# Patient Record
Sex: FEMALE | Race: WHITE | NOT HISPANIC OR LATINO | Employment: OTHER | URBAN - METROPOLITAN AREA
[De-identification: names, ages, dates, MRNs, and addresses within clinical notes are randomized per-mention and may not be internally consistent; named-entity substitution may affect disease eponyms.]

---

## 2022-01-01 ENCOUNTER — HOME CARE VISIT (OUTPATIENT)
Dept: HOME HOSPICE | Facility: HOSPICE | Age: 87
End: 2022-01-01
Payer: MEDICARE

## 2022-01-01 ENCOUNTER — HOME CARE VISIT (OUTPATIENT)
Dept: HOME HEALTH SERVICES | Facility: HOME HEALTHCARE | Age: 87
End: 2022-01-01
Payer: MEDICARE

## 2022-01-01 VITALS
RESPIRATION RATE: 18 BRPM | SYSTOLIC BLOOD PRESSURE: 102 MMHG | DIASTOLIC BLOOD PRESSURE: 54 MMHG | TEMPERATURE: 98.4 F | HEART RATE: 86 BPM | OXYGEN SATURATION: 98 %

## 2022-01-01 VITALS
RESPIRATION RATE: 18 BRPM | OXYGEN SATURATION: 96 % | DIASTOLIC BLOOD PRESSURE: 52 MMHG | SYSTOLIC BLOOD PRESSURE: 102 MMHG | HEART RATE: 80 BPM | TEMPERATURE: 97.6 F

## 2022-01-01 PROCEDURE — G0156 HHCP-SVS OF AIDE,EA 15 MIN: HCPCS

## 2022-01-01 PROCEDURE — G0299 HHS/HOSPICE OF RN EA 15 MIN: HCPCS

## 2022-01-01 PROCEDURE — G0155 HHCP-SVS OF CSW,EA 15 MIN: HCPCS

## 2022-05-12 ENCOUNTER — HOSPITAL ENCOUNTER (EMERGENCY)
Facility: HOSPITAL | Age: 87
Discharge: HOME | End: 2022-05-12
Attending: EMERGENCY MEDICINE | Admitting: EMERGENCY MEDICINE
Payer: COMMERCIAL

## 2022-05-12 ENCOUNTER — APPOINTMENT (EMERGENCY)
Dept: CT IMAGING | Facility: HOSPITAL | Age: 87
End: 2022-05-12
Payer: COMMERCIAL

## 2022-05-12 VITALS
OXYGEN SATURATION: 98 % | TEMPERATURE: 98.4 F | HEIGHT: 61 IN | SYSTOLIC BLOOD PRESSURE: 168 MMHG | HEART RATE: 75 BPM | DIASTOLIC BLOOD PRESSURE: 77 MMHG | RESPIRATION RATE: 16 BRPM | WEIGHT: 123 LBS | BODY MASS INDEX: 23.22 KG/M2

## 2022-05-12 DIAGNOSIS — K44.9 HIATAL HERNIA: ICD-10-CM

## 2022-05-12 DIAGNOSIS — N39.0 UTI (URINARY TRACT INFECTION): ICD-10-CM

## 2022-05-12 DIAGNOSIS — N81.10 BLADDER PROLAPSE, FEMALE, ACQUIRED: ICD-10-CM

## 2022-05-12 DIAGNOSIS — K80.20 GALLSTONES: ICD-10-CM

## 2022-05-12 DIAGNOSIS — R10.9 ABDOMINAL PAIN: Primary | ICD-10-CM

## 2022-05-12 DIAGNOSIS — N13.4 HYDROURETER ON LEFT: ICD-10-CM

## 2022-05-12 DIAGNOSIS — K86.89 PANCREATIC MASS: ICD-10-CM

## 2022-05-12 DIAGNOSIS — K40.20 BILATERAL INGUINAL HERNIA: ICD-10-CM

## 2022-05-12 LAB
ALBUMIN SERPL BCP-MCNC: 3.6 G/DL (ref 3.5–5)
ALP SERPL-CCNC: 106 U/L (ref 46–116)
ALT SERPL W P-5'-P-CCNC: 15 U/L (ref 12–78)
ANION GAP SERPL CALCULATED.3IONS-SCNC: 7 MMOL/L (ref 4–13)
AST SERPL W P-5'-P-CCNC: 16 U/L (ref 5–45)
BACTERIA UR QL AUTO: ABNORMAL /HPF
BASOPHILS # BLD AUTO: 0.04 THOUSANDS/ΜL (ref 0–0.1)
BASOPHILS NFR BLD AUTO: 0 % (ref 0–1)
BILIRUB SERPL-MCNC: 0.9 MG/DL (ref 0.2–1)
BILIRUB UR QL STRIP: NEGATIVE
BUN SERPL-MCNC: 22 MG/DL (ref 5–25)
CALCIUM SERPL-MCNC: 9.3 MG/DL (ref 8.3–10.1)
CHLORIDE SERPL-SCNC: 102 MMOL/L (ref 100–108)
CLARITY UR: ABNORMAL
CO2 SERPL-SCNC: 28 MMOL/L (ref 21–32)
COLOR UR: YELLOW
CREAT SERPL-MCNC: 1.26 MG/DL (ref 0.6–1.3)
EOSINOPHIL # BLD AUTO: 0.03 THOUSAND/ΜL (ref 0–0.61)
EOSINOPHIL NFR BLD AUTO: 0 % (ref 0–6)
ERYTHROCYTE [DISTWIDTH] IN BLOOD BY AUTOMATED COUNT: 13.7 % (ref 11.6–15.1)
GFR SERPL CREATININE-BSD FRML MDRD: 38 ML/MIN/1.73SQ M
GLUCOSE SERPL-MCNC: 173 MG/DL (ref 65–140)
GLUCOSE UR STRIP-MCNC: NEGATIVE MG/DL
HCT VFR BLD AUTO: 38.6 % (ref 34.8–46.1)
HGB BLD-MCNC: 11.9 G/DL (ref 11.5–15.4)
HGB UR QL STRIP.AUTO: ABNORMAL
HOLD SPECIMEN: NORMAL
IMM GRANULOCYTES # BLD AUTO: 0.03 THOUSAND/UL (ref 0–0.2)
IMM GRANULOCYTES NFR BLD AUTO: 0 % (ref 0–2)
KETONES UR STRIP-MCNC: NEGATIVE MG/DL
LEUKOCYTE ESTERASE UR QL STRIP: ABNORMAL
LIPASE SERPL-CCNC: 96 U/L (ref 73–393)
LYMPHOCYTES # BLD AUTO: 1.69 THOUSANDS/ΜL (ref 0.6–4.47)
LYMPHOCYTES NFR BLD AUTO: 18 % (ref 14–44)
MCH RBC QN AUTO: 26.6 PG (ref 26.8–34.3)
MCHC RBC AUTO-ENTMCNC: 30.8 G/DL (ref 31.4–37.4)
MCV RBC AUTO: 86 FL (ref 82–98)
MONOCYTES # BLD AUTO: 0.58 THOUSAND/ΜL (ref 0.17–1.22)
MONOCYTES NFR BLD AUTO: 6 % (ref 4–12)
NEUTROPHILS # BLD AUTO: 6.82 THOUSANDS/ΜL (ref 1.85–7.62)
NEUTS SEG NFR BLD AUTO: 76 % (ref 43–75)
NITRITE UR QL STRIP: POSITIVE
NON-SQ EPI CELLS URNS QL MICRO: ABNORMAL /HPF
NRBC BLD AUTO-RTO: 0 /100 WBCS
OTHER STN SPEC: ABNORMAL
PH UR STRIP.AUTO: 6 [PH]
PLATELET # BLD AUTO: 212 THOUSANDS/UL (ref 149–390)
PMV BLD AUTO: 10.9 FL (ref 8.9–12.7)
POTASSIUM SERPL-SCNC: 3.9 MMOL/L (ref 3.5–5.3)
PROT SERPL-MCNC: 8.3 G/DL (ref 6.4–8.2)
PROT UR STRIP-MCNC: ABNORMAL MG/DL
RBC # BLD AUTO: 4.48 MILLION/UL (ref 3.81–5.12)
RBC #/AREA URNS AUTO: ABNORMAL /HPF
SODIUM SERPL-SCNC: 137 MMOL/L (ref 136–145)
SP GR UR STRIP.AUTO: >=1.03 (ref 1–1.03)
UROBILINOGEN UR QL STRIP.AUTO: 0.2 E.U./DL
WBC # BLD AUTO: 9.19 THOUSAND/UL (ref 4.31–10.16)
WBC #/AREA URNS AUTO: ABNORMAL /HPF

## 2022-05-12 PROCEDURE — 85025 COMPLETE CBC W/AUTO DIFF WBC: CPT | Performed by: EMERGENCY MEDICINE

## 2022-05-12 PROCEDURE — 99284 EMERGENCY DEPT VISIT MOD MDM: CPT

## 2022-05-12 PROCEDURE — 99284 EMERGENCY DEPT VISIT MOD MDM: CPT | Performed by: PHYSICIAN ASSISTANT

## 2022-05-12 PROCEDURE — 74176 CT ABD & PELVIS W/O CONTRAST: CPT

## 2022-05-12 PROCEDURE — 83690 ASSAY OF LIPASE: CPT | Performed by: EMERGENCY MEDICINE

## 2022-05-12 PROCEDURE — 87086 URINE CULTURE/COLONY COUNT: CPT | Performed by: EMERGENCY MEDICINE

## 2022-05-12 PROCEDURE — 81001 URINALYSIS AUTO W/SCOPE: CPT | Performed by: EMERGENCY MEDICINE

## 2022-05-12 PROCEDURE — 36415 COLL VENOUS BLD VENIPUNCTURE: CPT

## 2022-05-12 PROCEDURE — 87186 SC STD MICRODIL/AGAR DIL: CPT | Performed by: EMERGENCY MEDICINE

## 2022-05-12 PROCEDURE — 87077 CULTURE AEROBIC IDENTIFY: CPT | Performed by: EMERGENCY MEDICINE

## 2022-05-12 PROCEDURE — 80053 COMPREHEN METABOLIC PANEL: CPT | Performed by: EMERGENCY MEDICINE

## 2022-05-12 PROCEDURE — G1004 CDSM NDSC: HCPCS

## 2022-05-12 RX ORDER — CEPHALEXIN 500 MG/1
500 CAPSULE ORAL EVERY 12 HOURS SCHEDULED
Qty: 14 CAPSULE | Refills: 0 | Status: SHIPPED | OUTPATIENT
Start: 2022-05-12 | End: 2022-05-19

## 2022-05-12 NOTE — DISCHARGE INSTRUCTIONS
Take keflex twice a day for urinary tract infection  Follow up with family doctor for recheck  REturn to ER if symptoms worsen  Follow up with GI doctor concerning pancreatic mass for further evaluation  Follow up with surgeon if you would like your inguinal hernias repaired  Tylenol/motrin for discomfort

## 2022-05-12 NOTE — ED PROVIDER NOTES
History  Chief Complaint   Patient presents with    Abdominal Pain     Pt with lower abd  Pain x 3 5 weeks  Denies n/v/d  Patient is an 80 y/o F that presents to the ED with lower abdominal pain x 3 weeks  She states it started after eating and has not gone away  She states the pain is worse at night when lying in bed at night  She denies nausea, vomiting or diarrhea  Her last BM was today, no blood in stool  No fevers, chills  She did not take anything for the pain  No urinary symptoms  No radiation of pain  No prior surgeries to abdomen  History provided by:  Patient  Abdominal Pain  Pain location:  Generalized  Pain quality: aching    Pain radiates to:  Does not radiate  Pain severity:  Moderate  Onset quality:  Gradual  Duration:  3 weeks  Timing:  Intermittent  Progression:  Unchanged  Chronicity:  New  Context: not sick contacts, not suspicious food intake and not trauma    Relieved by:  Nothing  Exacerbated by: lying flat  Ineffective treatments:  None tried  Associated symptoms: anorexia    Associated symptoms: no chest pain, no chills, no constipation, no cough, no diarrhea, no dysuria, no fever, no nausea, no shortness of breath and no vomiting    Risk factors: being elderly    Risk factors: no alcohol abuse, has not had multiple surgeries, not obese and no recent hospitalization        None       History reviewed  No pertinent past medical history  Past Surgical History:   Procedure Laterality Date    FEMUR FRACTURE SURGERY         History reviewed  No pertinent family history  I have reviewed and agree with the history as documented      E-Cigarette/Vaping    E-Cigarette Use Never User      E-Cigarette/Vaping Substances    Nicotine No     THC No     CBD No     Flavoring No     Other No     Unknown No      Social History     Tobacco Use    Smoking status: Never Smoker    Smokeless tobacco: Never Used   Vaping Use    Vaping Use: Never used   Substance Use Topics    Alcohol use: Not Currently       Review of Systems   Constitutional: Negative for chills and fever  HENT: Negative  Respiratory: Negative for cough and shortness of breath  Cardiovascular: Negative for chest pain, palpitations and leg swelling  Gastrointestinal: Positive for abdominal pain and anorexia  Negative for blood in stool, constipation, diarrhea, nausea and vomiting  Genitourinary: Negative for dysuria and frequency  Musculoskeletal: Negative for back pain and neck pain  Skin: Negative for color change and rash  Neurological: Negative for dizziness, weakness and numbness  Psychiatric/Behavioral: Negative for confusion  All other systems reviewed and are negative  Physical Exam  Physical Exam  Vitals and nursing note reviewed  Constitutional:       General: She is not in acute distress  Appearance: Normal appearance  She is well-developed, well-groomed and normal weight  She is not ill-appearing or diaphoretic  HENT:      Head: Normocephalic and atraumatic  Right Ear: External ear normal       Left Ear: External ear normal       Nose: Nose normal       Mouth/Throat:      Mouth: Mucous membranes are moist       Pharynx: Oropharynx is clear  Eyes:      Conjunctiva/sclera: Conjunctivae normal       Pupils: Pupils are equal    Cardiovascular:      Rate and Rhythm: Normal rate and regular rhythm  Heart sounds: Normal heart sounds  Pulmonary:      Effort: Pulmonary effort is normal       Breath sounds: Normal breath sounds  No wheezing, rhonchi or rales  Abdominal:      General: Abdomen is flat  Bowel sounds are normal       Palpations: Abdomen is soft  Tenderness: There is abdominal tenderness in the right lower quadrant, suprapubic area and left lower quadrant  There is no guarding or rebound  Musculoskeletal:         General: Normal range of motion  Cervical back: Normal range of motion and neck supple  Right lower leg: No edema        Left lower leg: No edema  Skin:     General: Skin is warm and dry  Coloration: Skin is not jaundiced or pale  Neurological:      General: No focal deficit present  Mental Status: She is alert and oriented to person, place, and time  Motor: No weakness  Psychiatric:         Mood and Affect: Mood normal          Behavior: Behavior is cooperative  Vital Signs  ED Triage Vitals [05/12/22 1058]   Temperature Pulse Respirations Blood Pressure SpO2   98 4 °F (36 9 °C) 75 16 168/77 98 %      Temp Source Heart Rate Source Patient Position - Orthostatic VS BP Location FiO2 (%)   Temporal -- Sitting Left arm --      Pain Score       7           Vitals:    05/12/22 1058   BP: 168/77   Pulse: 75   Patient Position - Orthostatic VS: Sitting         Visual Acuity      ED Medications  Medications - No data to display    Diagnostic Studies  Results Reviewed     Procedure Component Value Units Date/Time    Urine Microscopic [[id number]]  (Abnormal) Collected: 05/12/22 1103    Lab Status: Final result Specimen: Urine, Clean Catch Updated: 05/12/22 1400     RBC, UA 10-20 /hpf      WBC, UA Innumerable /hpf      Epithelial Cells None Seen /hpf      Bacteria, UA Innumerable /hpf      OTHER OBSERVATIONS WBCs Clumped  Renal Epithelial Cells Present    Urine culture [[id number]] Collected: 05/12/22 1103    Lab Status:  In process Specimen: Urine, Clean Catch Updated: 05/12/22 1400    UA w Reflex to Microscopic w Reflex to Culture [[id number]]  (Abnormal) Collected: 05/12/22 1103    Lab Status: Final result Specimen: Urine, Clean Catch Updated: 05/12/22 1355     Color, UA Yellow     Clarity, UA Turbid     Specific Gravity, UA >=1 030     pH, UA 6 0     Leukocytes, UA Moderate     Nitrite, UA Positive     Protein,  (2+) mg/dl      Glucose, UA Negative mg/dl      Ketones, UA Negative mg/dl      Urobilinogen, UA 0 2 E U /dl      Bilirubin, UA Negative     Blood, UA Moderate    Trauma tubes on hold [[id number]] Collected: 05/12/22 1107    Lab Status: Final result Specimen: Blood from Arm, Left Updated: 05/12/22 1308    Narrative: The following orders were created for panel order Trauma tubes on hold  Procedure                               Abnormality         Status                     ---------                               -----------         ------                     Cary Speaks Top on CMBN[[id number]]                           Final result               Gold top on BSJO[[id number]]                                 Final result               Green / Black tube on ZHOB[[id number]]                       Final result                 Please view results for these tests on the individual orders      Comprehensive metabolic panel [[id number]]  (Abnormal) Collected: 05/12/22 1104    Lab Status: Final result Specimen: Blood from Arm, Left Updated: 05/12/22 1136     Sodium 137 mmol/L      Potassium 3 9 mmol/L      Chloride 102 mmol/L      CO2 28 mmol/L      ANION GAP 7 mmol/L      BUN 22 mg/dL      Creatinine 1 26 mg/dL      Glucose 173 mg/dL      Calcium 9 3 mg/dL      AST 16 U/L      ALT 15 U/L      Alkaline Phosphatase 106 U/L      Total Protein 8 3 g/dL      Albumin 3 6 g/dL      Total Bilirubin 0 90 mg/dL      eGFR 38 ml/min/1 73sq m     Narrative:      [place] guidelines for Chronic Kidney Disease (CKD):     Stage 1 with normal or high GFR (GFR > 90 mL/min/1 73 square meters)    Stage 2 Mild CKD (GFR = 60-89 mL/min/1 73 square meters)    Stage 3A Moderate CKD (GFR = 45-59 mL/min/1 73 square meters)    Stage 3B Moderate CKD (GFR = 30-44 mL/min/1 73 square meters)    Stage 4 Severe CKD (GFR = 15-29 mL/min/1 73 square meters)    Stage 5 End Stage CKD (GFR <15 mL/min/1 73 square meters)  Note: GFR calculation is accurate only with a steady state creatinine    Lipase [[id number]]  (Normal) Collected: 05/12/22 1104    Lab Status: Final result Specimen: Blood from Arm, Left Updated: 05/12/22 1136     Lipase 96 u/L     CBC and differential [[id number]]  (Abnormal) Collected: 05/12/22 1104    Lab Status: Final result Specimen: Blood from Arm, Left Updated: 05/12/22 1116     WBC 9 19 Thousand/uL      RBC 4 48 Million/uL      Hemoglobin 11 9 g/dL      Hematocrit 38 6 %      MCV 86 fL      MCH 26 6 pg      MCHC 30 8 g/dL      RDW 13 7 %      MPV 10 9 fL      Platelets 330 Thousands/uL      nRBC 0 /100 WBCs      Neutrophils Relative 76 %      Immat GRANS % 0 %      Lymphocytes Relative 18 %      Monocytes Relative 6 %      Eosinophils Relative 0 %      Basophils Relative 0 %      Neutrophils Absolute 6 82 Thousands/µL      Immature Grans Absolute 0 03 Thousand/uL      Lymphocytes Absolute 1 69 Thousands/µL      Monocytes Absolute 0 58 Thousand/µL      Eosinophils Absolute 0 03 Thousand/µL      Basophils Absolute 0 04 Thousands/µL                  CT abdomen pelvis wo contrast   Final Result by Julian Walker MD (05/12 1325)       1  Suspected pancreatic mass  Additional evaluation with MRI abdomen with MRCP recommended  2   Left hydronephrosis and hydroureter with urinary bladder not visualized, prolapsed  Urothelial thickening could be related to underlying infection, inflammation or less likely neoplasm  Etiology of obstruction is not apparent  Evaluation for CT    urogram should be obtained when intravenous contrast is available  3   Cholelithiasis  4   Bilateral inguinal hernias with no complete obstruction on this study without oral contrast    I personally discussed this study with Des Peers on 5/12/2022 at 1:23 PM          Workstation performed: [id number]                    Procedures  Procedures         ED Course                               SBIRT 22yo+    Flowsheet Row Most Recent Value   SBIRT (23 yo +)    In order to provide better care to our patients, we are screening all of our patients for alcohol and drug use  Would it be okay to ask you these screening questions?  Yes Filed at: 05/12/2022 1102   Initial Alcohol Screen: US AUDIT-C     1  How often do you have a drink containing alcohol? 0 Filed at: 05/12/2022 1102   2  How many drinks containing alcohol do you have on a typical day you are drinking? 0 Filed at: 05/12/2022 1102   3a  Male UNDER 65: How often do you have five or more drinks on one occasion? 0 Filed at: 05/12/2022 1102   3b  FEMALE Any Age, or MALE 65+: How often do you have 4 or more drinks on one occassion? 0 Filed at: 05/12/2022 1102   Audit-C Score 0 Filed at: 05/12/2022 1102   JONEL: How many times in the past year have you    Used an illegal drug or used a prescription medication for non-medical reasons? Never Filed at: 05/12/2022 1102                    MDM  Number of Diagnoses or Management Options  Abdominal pain: new and requires workup  Bilateral inguinal hernia: new and requires workup  Bladder prolapse, female, acquired: established and worsening  Gallstones: new and requires workup  Hiatal hernia: established and worsening  Hydroureter on left: new and requires workup  Pancreatic mass: new and requires workup  UTI (urinary tract infection): new and requires workup  Diagnosis management comments: Patient with lower abdominal pain for 3 weeks, will check labs, CT abd and pelvis to r/o colitis, diverticulitis, UTI  Patient found to have UTI, will treat with keflex  Patient with multiple abnormal findings on CT scan:  Pancreatic mass suggested f/u with GI for further evaluation to r/o pancreatic cancer  Hydroureter most likely from infection  Patient with hernias, advised f/u with surgery  Return precautions given          Amount and/or Complexity of Data Reviewed  Clinical lab tests: ordered and reviewed  Tests in the radiology section of CPT®: reviewed and ordered  Discuss the patient with other providers: yes    Patient Progress  Patient progress: stable      Disposition  Final diagnoses:   Abdominal pain   UTI (urinary tract infection)   Hiatal hernia Pancreatic mass   Hydroureter on left   Bladder prolapse, female, acquired   Bilateral inguinal hernia   Gallstones     Time reflects when diagnosis was documented in both MDM as applicable and the Disposition within this note     Time User Action Codes Description Comment    5/12/2022  2:04 PM Tunisian Blander Add [R10 9] Abdominal pain     5/12/2022  2:04 PM Tunisian Blander Add [N39 0] UTI (urinary tract infection)     5/12/2022  2:04 PM Tunisian Blander Add [K44 9] Hiatal hernia     5/12/2022  2:04 PM Tunisian Blander Add [K86 89] Pancreatic mass     5/12/2022  2:05 PM Tunisian Blander Add [N13 4] Hydroureter on left     5/12/2022  2:05 PM Tunisian Blander Add [N81 10] Bladder prolapse, female, acquired     5/12/2022  2:05 PM Tunisian Blander Add [K40 20] Bilateral inguinal hernia     5/12/2022  2:05 PM Tunisian Blander Add [K80 20] Gallstones       ED Disposition     ED Disposition   Discharge    Condition   Stable    Date/Time   Thu May 12, 2022  2:04 PM    Comment   Gabbyia Rory discharge to home/self care  Follow-up Information     Follow up With Specialties Details Why Contact Info Additional [address]  [facility] Schedule an appointment as soon as possible for a visit  for further evaluation of pancreatic mass   [address] [place] [phone]  [facility] [address] [place]  [address]   [phone]     Salley Schaumann, MD General Surgery Schedule an appointment as soon as possible for a visit  as needed for inguinal hernias [address]             Discharge Medication List as of 5/12/2022  2:08 PM      START taking these medications    Details   cephalexin (KEFLEX) 500 mg capsule Take 1 capsule (500 mg total) by mouth every 12 (twelve) hours for 7 days, Starting Thu 5/12/2022, Until Thu 5/19/2022, Normal             No discharge procedures on file      PDMP Review     None          ED Provider  Electronically Signed by           Dulce Davila PA-C  05/12/22 3922

## 2022-05-15 LAB
BACTERIA UR CULT: ABNORMAL
BACTERIA UR CULT: ABNORMAL

## 2022-05-31 ENCOUNTER — TELEPHONE (OUTPATIENT)
Dept: FAMILY MEDICINE CLINIC | Facility: HOSPITAL | Age: 87
End: 2022-05-31

## 2022-06-01 ENCOUNTER — OFFICE VISIT (OUTPATIENT)
Dept: FAMILY MEDICINE CLINIC | Facility: HOSPITAL | Age: 87
End: 2022-06-01
Payer: COMMERCIAL

## 2022-06-01 VITALS
OXYGEN SATURATION: 97 % | SYSTOLIC BLOOD PRESSURE: 138 MMHG | WEIGHT: 114.8 LBS | HEART RATE: 70 BPM | BODY MASS INDEX: 21.67 KG/M2 | HEIGHT: 61 IN | DIASTOLIC BLOOD PRESSURE: 68 MMHG

## 2022-06-01 DIAGNOSIS — R73.01 ELEVATED FASTING GLUCOSE: ICD-10-CM

## 2022-06-01 DIAGNOSIS — G89.29 ABDOMINAL PAIN, CHRONIC, BILATERAL LOWER QUADRANT: Primary | ICD-10-CM

## 2022-06-01 DIAGNOSIS — R59.1 LYMPHADENOPATHY: ICD-10-CM

## 2022-06-01 DIAGNOSIS — N30.00 ACUTE CYSTITIS WITHOUT HEMATURIA: ICD-10-CM

## 2022-06-01 DIAGNOSIS — N18.31 STAGE 3A CHRONIC KIDNEY DISEASE (HCC): ICD-10-CM

## 2022-06-01 DIAGNOSIS — R10.32 ABDOMINAL PAIN, CHRONIC, BILATERAL LOWER QUADRANT: Primary | ICD-10-CM

## 2022-06-01 DIAGNOSIS — A49.8 E COLI INFECTION: ICD-10-CM

## 2022-06-01 DIAGNOSIS — K86.89 MASS OF PANCREAS: ICD-10-CM

## 2022-06-01 DIAGNOSIS — R10.31 ABDOMINAL PAIN, CHRONIC, BILATERAL LOWER QUADRANT: Primary | ICD-10-CM

## 2022-06-01 PROBLEM — M62.81 GENERALIZED MUSCLE WEAKNESS: Status: ACTIVE | Noted: 2018-04-13

## 2022-06-01 PROBLEM — N81.4 UTEROVAGINAL PROLAPSE, UNSPECIFIED: Status: ACTIVE | Noted: 2018-04-13

## 2022-06-01 PROBLEM — R26.2 DIFFICULTY IN WALKING, NOT ELSEWHERE CLASSIFIED: Status: ACTIVE | Noted: 2018-04-13

## 2022-06-01 PROBLEM — D62 ACUTE POSTHEMORRHAGIC ANEMIA: Status: ACTIVE | Noted: 2018-04-13

## 2022-06-01 PROBLEM — K30 FUNCTIONAL DYSPEPSIA: Status: ACTIVE | Noted: 2018-04-13

## 2022-06-01 LAB
SL AMB  POCT GLUCOSE, UA: ABNORMAL
SL AMB LEUKOCYTE ESTERASE,UA: ABNORMAL
SL AMB POCT BILIRUBIN,UA: ABNORMAL
SL AMB POCT BLOOD,UA: ABNORMAL
SL AMB POCT CLARITY,UA: ABNORMAL
SL AMB POCT COLOR,UA: YELLOW
SL AMB POCT HEMOGLOBIN AIC: 7.6 (ref ?–6.5)
SL AMB POCT KETONES,UA: ABNORMAL
SL AMB POCT NITRITE,UA: ABNORMAL
SL AMB POCT PH,UA: 6
SL AMB POCT SPECIFIC GRAVITY,UA: 1.02
SL AMB POCT URINE PROTEIN: ABNORMAL
SL AMB POCT UROBILINOGEN: ABNORMAL

## 2022-06-01 PROCEDURE — 87186 SC STD MICRODIL/AGAR DIL: CPT | Performed by: STUDENT IN AN ORGANIZED HEALTH CARE EDUCATION/TRAINING PROGRAM

## 2022-06-01 PROCEDURE — 1160F RVW MEDS BY RX/DR IN RCRD: CPT | Performed by: STUDENT IN AN ORGANIZED HEALTH CARE EDUCATION/TRAINING PROGRAM

## 2022-06-01 PROCEDURE — 83036 HEMOGLOBIN GLYCOSYLATED A1C: CPT | Performed by: STUDENT IN AN ORGANIZED HEALTH CARE EDUCATION/TRAINING PROGRAM

## 2022-06-01 PROCEDURE — 87077 CULTURE AEROBIC IDENTIFY: CPT | Performed by: STUDENT IN AN ORGANIZED HEALTH CARE EDUCATION/TRAINING PROGRAM

## 2022-06-01 PROCEDURE — 3725F SCREEN DEPRESSION PERFORMED: CPT | Performed by: STUDENT IN AN ORGANIZED HEALTH CARE EDUCATION/TRAINING PROGRAM

## 2022-06-01 PROCEDURE — 87086 URINE CULTURE/COLONY COUNT: CPT | Performed by: STUDENT IN AN ORGANIZED HEALTH CARE EDUCATION/TRAINING PROGRAM

## 2022-06-01 PROCEDURE — 81003 URINALYSIS AUTO W/O SCOPE: CPT | Performed by: STUDENT IN AN ORGANIZED HEALTH CARE EDUCATION/TRAINING PROGRAM

## 2022-06-01 PROCEDURE — 99204 OFFICE O/P NEW MOD 45 MIN: CPT | Performed by: STUDENT IN AN ORGANIZED HEALTH CARE EDUCATION/TRAINING PROGRAM

## 2022-06-01 RX ORDER — SULFAMETHOXAZOLE AND TRIMETHOPRIM 800; 160 MG/1; MG/1
1 TABLET ORAL 2 TIMES DAILY
Qty: 14 TABLET | Refills: 0 | Status: SHIPPED | OUTPATIENT
Start: 2022-06-01 | End: 2022-06-08

## 2022-06-01 RX ORDER — NAPROXEN 500 MG/1
500 TABLET ORAL 2 TIMES DAILY WITH MEALS
Qty: 60 TABLET | Refills: 0 | Status: SHIPPED | OUTPATIENT
Start: 2022-06-01 | End: 2022-06-01

## 2022-06-01 RX ORDER — TRAMADOL HYDROCHLORIDE 50 MG/1
50 TABLET ORAL EVERY 12 HOURS PRN
Qty: 20 TABLET | Refills: 0 | Status: SHIPPED | OUTPATIENT
Start: 2022-06-01 | End: 2022-06-22

## 2022-06-01 NOTE — PROGRESS NOTES
[address] [facility],     Assessment/Plan:      Diagnosis ICD-10-CM Associated Orders   1  Abdominal pain, chronic, bilateral lower quadrant  R10 31 MRI abdomen w wo contrast and mrcp    G89 29 Urine culture    R10 32 US kidney and bladder with pvr     POCT hemoglobin A1c     Urine culture     sulfamethoxazole-trimethoprim (BACTRIM DS) 800-160 mg per tablet     traMADol (Ultram) 50 mg tablet   2  E coli infection  A49 8 MRI abdomen w wo contrast and mrcp     Urine culture     US kidney and bladder with pvr     Urine culture     sulfamethoxazole-trimethoprim (BACTRIM DS) 800-160 mg per tablet   3  Elevated fasting glucose  R73 01 POCT hemoglobin A1c   4  Acute cystitis without hematuria  N30 00 MRI abdomen w wo contrast and mrcp     Urine culture     US kidney and bladder with pvr     Urine culture     POCT urine dip auto non-scope     sulfamethoxazole-trimethoprim (BACTRIM DS) 800-160 mg per tablet   5  Mass of pancreas  K86 89 MRI abdomen w wo contrast and mrcp     Ambulatory Referral to Hematology / Oncology   6  Stage 3a chronic kidney disease (HCC)  N18 31    7  Lymphadenopathy  R59 1 Ambulatory Referral to Hematology / Oncology      Antibiotics ordered for UTI   Imaging as above, and referral as well  Follow-up in 1-2 months   Patient may call or return to office with any questions or concerns  ______________________________________________________________________  Subjective:     Patient ID: Fariba Bran is a 81 y o  female  HPI  Fariba Bran  Chief Complaint   Patient presents with   [facility]      PT was seen in the ER for abdominal pain -possible pancreas mass  Per ER note patient was seen in the ER on 5/12/22 for lower abdominal pain for 3 and half weeks that would not resolve  The pain is worse at night  But she was not experiencing nausea, vomiting, or diarrhea  No history of abdominal surgeries    Pain at the time mostly noted in the right and left lower quadrants  No elevation and wbc's, hemoglobin 11 9  Lipase 96, CMP grossly normal apart from glucose of 173  Can't sleep due to pain  123 lb weight was guess, but on exiting ED was 119 lbs, now down to 114 lbs  Decreased appetite, but has been in too much pain & feeling too full to eat  Per CT abdomen/pelvis without contrast   1   Suspected pancreatic mass  Additional evaluation with MRI abdomen with MRCP recommended  2   Left hydronephrosis and hydroureter with urinary bladder not visualized, prolapsed  Urothelial thickening could be related to underlying infection, inflammation or less likely neoplasm  Etiology of obstruction is not apparent  Evaluation for CT    urogram should be obtained when intravenous contrast is available  3   Cholelithiasis  4   Bilateral inguinal hernias with no complete obstruction on this study without oral contrast         The following portions of the patient's history were reviewed and updated as appropriate: allergies, current medications, past family history, past medical history, past social history, past surgical history and problem list     Review of Systems   Constitutional: Positive for appetite change (decreased )  Negative for chills, fatigue and fever  HENT: Negative for congestion, rhinorrhea and sore throat  Respiratory: Negative for cough and shortness of breath  Gastrointestinal: Positive for abdominal distention and abdominal pain  Negative for constipation (Some BM's today), diarrhea, nausea and vomiting  Objective:      Vitals:    06/01/22 1540   BP: 138/68   Pulse: 70   SpO2: 97%      Physical Exam  Vitals reviewed  Constitutional:       Appearance: She is well-developed  Comments: Petite, frail-appearing, pleasant   HENT:      Head: Normocephalic and atraumatic  Eyes:      General: No scleral icterus  Right eye: No discharge  Left eye: No discharge     Cardiovascular:      Rate and Rhythm: Normal rate and regular rhythm  Pulses: Normal pulses  Heart sounds: Normal heart sounds  No murmur heard  Pulmonary:      Effort: Pulmonary effort is normal  No respiratory distress  Breath sounds: Normal breath sounds  No stridor  No wheezing  Abdominal:      General: Abdomen is flat  Bowel sounds are normal  There is no distension  Palpations: Abdomen is soft  Tenderness: There is abdominal tenderness (Bilateral lower quadrants)  There is no right CVA tenderness or left CVA tenderness  Musculoskeletal:      Cervical back: Normal range of motion  Right lower leg: No edema  Left lower leg: No edema  Skin:     General: Skin is warm  Neurological:      Mental Status: She is alert and oriented to person, place, and time  Psychiatric:         Mood and Affect: Mood normal          Behavior: Behavior normal          Thought Content: Thought content normal          Judgment: Judgment normal            Portions of the record may have been created with voice recognition software  Occasional wrong word or "sound alike" substitutions may have occurred due to the inherent limitations of voice recognition software  Please review the chart carefully and recognize, using context, where substitutions/typographical errors may have occurred

## 2022-06-02 ENCOUNTER — HOSPITAL ENCOUNTER (OUTPATIENT)
Dept: ULTRASOUND IMAGING | Facility: HOSPITAL | Age: 87
Discharge: HOME | End: 2022-06-02
Attending: STUDENT IN AN ORGANIZED HEALTH CARE EDUCATION/TRAINING PROGRAM
Payer: COMMERCIAL

## 2022-06-02 DIAGNOSIS — G89.29 ABDOMINAL PAIN, CHRONIC, BILATERAL LOWER QUADRANT: ICD-10-CM

## 2022-06-02 DIAGNOSIS — N30.00 ACUTE CYSTITIS WITHOUT HEMATURIA: ICD-10-CM

## 2022-06-02 DIAGNOSIS — R10.31 ABDOMINAL PAIN, CHRONIC, BILATERAL LOWER QUADRANT: ICD-10-CM

## 2022-06-02 DIAGNOSIS — R10.32 ABDOMINAL PAIN, CHRONIC, BILATERAL LOWER QUADRANT: ICD-10-CM

## 2022-06-02 DIAGNOSIS — A49.8 E COLI INFECTION: ICD-10-CM

## 2022-06-02 PROCEDURE — 76770 US EXAM ABDO BACK WALL COMP: CPT

## 2022-06-04 LAB — BACTERIA UR CULT: ABNORMAL

## 2022-06-10 ENCOUNTER — TELEPHONE (OUTPATIENT)
Dept: FAMILY MEDICINE CLINIC | Facility: HOSPITAL | Age: 87
End: 2022-06-10

## 2022-06-10 NOTE — TELEPHONE ENCOUNTER
Patient would like a call today before you leave  Called to make MRI appt and they cannot get her in til July 14, over a month away

## 2022-06-14 ENCOUNTER — HOSPITAL ENCOUNTER (OUTPATIENT)
Dept: MRI IMAGING | Facility: HOSPITAL | Age: 87
Discharge: HOME | End: 2022-06-14
Attending: STUDENT IN AN ORGANIZED HEALTH CARE EDUCATION/TRAINING PROGRAM
Payer: COMMERCIAL

## 2022-06-14 DIAGNOSIS — R10.31 ABDOMINAL PAIN, CHRONIC, BILATERAL LOWER QUADRANT: ICD-10-CM

## 2022-06-14 DIAGNOSIS — N30.00 ACUTE CYSTITIS WITHOUT HEMATURIA: ICD-10-CM

## 2022-06-14 DIAGNOSIS — G89.29 ABDOMINAL PAIN, CHRONIC, BILATERAL LOWER QUADRANT: ICD-10-CM

## 2022-06-14 DIAGNOSIS — K86.89 MASS OF PANCREAS: ICD-10-CM

## 2022-06-14 DIAGNOSIS — R10.32 ABDOMINAL PAIN, CHRONIC, BILATERAL LOWER QUADRANT: ICD-10-CM

## 2022-06-14 DIAGNOSIS — A49.8 E COLI INFECTION: ICD-10-CM

## 2022-06-14 PROCEDURE — 74183 MRI ABD W/O CNTR FLWD CNTR: CPT

## 2022-06-14 PROCEDURE — A9585 GADOBUTROL INJECTION: HCPCS | Performed by: STUDENT IN AN ORGANIZED HEALTH CARE EDUCATION/TRAINING PROGRAM

## 2022-06-14 RX ADMIN — GADOBUTROL 5 ML: 604.72 INJECTION INTRAVENOUS at 16:39

## 2022-06-22 DIAGNOSIS — G89.29 ABDOMINAL PAIN, CHRONIC, BILATERAL LOWER QUADRANT: ICD-10-CM

## 2022-06-22 DIAGNOSIS — R10.32 ABDOMINAL PAIN, CHRONIC, BILATERAL LOWER QUADRANT: ICD-10-CM

## 2022-06-22 DIAGNOSIS — R10.31 ABDOMINAL PAIN, CHRONIC, BILATERAL LOWER QUADRANT: ICD-10-CM

## 2022-06-22 RX ORDER — TRAMADOL HYDROCHLORIDE 50 MG/1
TABLET ORAL
Qty: 30 TABLET | Refills: 1 | Status: SHIPPED | OUTPATIENT
Start: 2022-06-22 | End: 2022-07-29

## 2022-06-24 ENCOUNTER — PATIENT OUTREACH (OUTPATIENT)
Dept: HEMATOLOGY ONCOLOGY | Facility: CLINIC | Age: 87
End: 2022-06-24

## 2022-06-24 ENCOUNTER — TELEPHONE (OUTPATIENT)
Dept: HEMATOLOGY ONCOLOGY | Facility: CLINIC | Age: 87
End: 2022-06-24

## 2022-06-24 DIAGNOSIS — R10.32 ABDOMINAL PAIN, CHRONIC, BILATERAL LOWER QUADRANT: Primary | ICD-10-CM

## 2022-06-24 DIAGNOSIS — K86.89 PANCREATIC MASS: Primary | ICD-10-CM

## 2022-06-24 DIAGNOSIS — K86.89 MASS OF PANCREAS: ICD-10-CM

## 2022-06-24 DIAGNOSIS — K86.89 MASS OF PANCREAS: Primary | ICD-10-CM

## 2022-06-24 DIAGNOSIS — R59.1 LYMPHADENOPATHY: ICD-10-CM

## 2022-06-24 DIAGNOSIS — G89.29 ABDOMINAL PAIN, CHRONIC, BILATERAL LOWER QUADRANT: Primary | ICD-10-CM

## 2022-06-24 DIAGNOSIS — R10.31 ABDOMINAL PAIN, CHRONIC, BILATERAL LOWER QUADRANT: Primary | ICD-10-CM

## 2022-06-24 NOTE — PROGRESS NOTES
Left message with pt that a CT chest has been set up at [facility] on 7/5, also let her know IR will be calling to set up her biopsy, the order is currently in for review, but will be set up shortly    Left my contact information if pt has any questions

## 2022-06-24 NOTE — TELEPHONE ENCOUNTER
[id number] [facility] RESIDENCY PROGRAM  PROGRESS NOTE     3/27/2021  PCP: Maryam Jay MD     Assessment/Plan:     Ruth Larios is a 60 y.o. female with a PMHx of metastatic adenocarcinoma, T2DM, Neuropathy, HTN who is admitted for Severe Sepsis. 24 hour events: None     Septic Shock: POA 4/4 SIRS (T100.8F, WBC 15.5, RR 39, HR 94), LA 5.6 (resolved). Likely 2/2 PICC line VS. intraabdominal. CXR w/o PNA. CT a/p ileus and locules of free air. Pt with open laparotomy incision with packing.   - BCx NG x 3d  - Ucx 40K aerococcus sanguinous (covered w/ meropenem)  - Neosyn gtt to maintain MAP > 65 (started 3/23), no longer weaning,          - Consult cardiology for recs of pressure support: levophed and/or midodrine?         - Will talk to primary surgeon at [facility] to get recs on management of low BP given high stool output  - Continue Vanc and Meropenem (started 3/23)  - General surgery consulted - free locules of air more likely related to small fascial dehiscence opposed to perforated bowel. - OK for diet, no intervention at this time       - ostomy care following  - GI consulted       - Increased colestipol       - Protonix BID       - Continue psyllium, Imodium, Lomotil  - Infectious disease consulted due to persistently elevated WBC despite aggressive antibiotics         - F/u Wound culture of open surgical incision site  - Nutrition consulted - consistent carb diet + protein supplementation  -Strict I&O  -Daily CBC, BMP    Hypokalemia: Improved . 2/2 high output from ostomy bag POA K 2.1.  - Monitor closely with BMP twice daily, replete as needed  - NS + 20meq KCl MIVF  - Immodium, lomotil, psyllium to bulk stools    SOB: Covid neg. CXR slight inc L sm-mod pleural effusion & basilar atelectasis. LVEF 55-60% (2/2021).     - Pulmonology consulted - hypoxia likely d/t atelectasis secondary to dec diaphragmatic excursion.          - recommend against thoracentesis          - albuterol neb prn Forwarding    Biopsy order has been entered for wheezing       Metabolic alkalosis w/ resp acidosis: persistent. Likely 2/2 gastric losses with ileostomy.    - Monitor bicarb on daily CMP    Hypotension:   - Pramod-syn to maintain MAP > 65, likely will need to transition to Levophed today since no longer weaning pramod     AMS: Resolved. Likely 2/2 severe hypokalemia vs sepsis. CT head nml. POA glu 80. EtOH/UDS neg. Trop, NH3 neg.        Elevated Cr in CKD3: Likely 2/2 IVVD. POA Cr 1.72 (BL 1.1-1.3). -Continue to monitor on daily labs, expect to improve with fluids     Metastatic gastric/colon adenocarcinoma: S/p laparotomy, with open incision w/ packing, scheduled to see oncologic surgery on 3/26 at [facility]. S/p Ileostomy and PEG placement (3/2021). Home Lomotil 2 tabs QID, Imodium 4 caps QID prn, Metamucil TID with meals. Home 2L LR daily. F/w Surg Onc Dr. Lori Alberto ([facility]). -Consider Heme-Once consult   -Continue Lomotil QID, Psyllium husk TID w/meals  - Wound care following     Bilateral pulmonary emboli: Diagnosed 2/3/2021  - Continue home lovenox 80mg BID     Bilateral Sacral ulcers: Pt following with home wound care.   -Wound care, appreciate assistance     Prolonged QTc: POA QTc 513  -Avoid QTc prolonging agents     T2DM: A1c 7.9 (11/2020). Home Januvia 100mg daily. No home Detremir last 2 days due to fasting BG in 70-80s.    -HOLD home Januvia 100mg daily  -HOLD home Detremir given low BG readings, will restart if needed  -SSI w/POC glu checks     HTN: BP on admission 124/56. BP normotensive outpatient without antihypertensives, previously on lisinopril 10mg QHS. -Monitor BP      Wheezing: Chronic. 0.5PPD smoker x 40 years. Home Albuterol inhaler 2 puff q4H prn.   -Rec'd PFT outpatient     PTSD: Rarely uses Buspar 10mg BID PRN.       Severe aortic atherosclerosis: Severe sclerosis of the abdominal aorta and common iliac and femoral arteries noted on CT abd/pelv 1/21.    -Follow up outpatient Vascular     HLD: Lipid panel 11/2020 w/ Tchol 192, HDL 37, LDL 125, tri 170. Holding home Lipitor 40mg QHS.       FEN/GI - Regular. NS + KCL 20mEq at 125cc/hr  Activity - Ambulate with assistance  DVT prophylaxis - Lovenox  GI prophylaxis - Not indicated at this time  Fall prophylaxis - Fall precautions ordered. Disposition - Admit to Telemetry. Plan to d/c to Home. Consulting PT and OT  Code Status - Full. Discussed with Daughter. Next of Kin Name and Alfie Luna,  Daughter - [phone]  [address] [place] [address] discussed with Dr. Kassandra Burks     Subjective:   Pt was seen and examined at bedside. Afebrile and hemodynamically stable. Concerns overnight include: None. Denies chest pain, SOB, nausea, vomiting, abdominal pain, dizziness. Objective:   Physical examination  Patient Vitals for the past 24 hrs:   Temp Pulse Resp BP SpO2   21 1205    (!) 156/67    21 1111 97.4 °F (36.3 °C) 77 24 (!) 73/33    21 0722 97.3 °F (36.3 °C) 60 23 (!) 120/50 96 %   21 0717  66      21 0610  70  102/78 95 %   21 0300 98.2 °F (36.8 °C) 62 13 (!) 129/47 94 %   21 0207  61 20 (!) 120/53 95 %   21 0001 98.5 °F (36.9 °C) 60 25 (!) 120/55 97 %   21 2350  93  (!) 93/47    21 2346  69      21 2210  67  (!) 120/48    21 2115  68  107/73    21 98.5 °F (36.9 °C) 63 19 (!) 111/39 96 %   21 1501 98 °F (36.7 °C) 66 24 (!) 127/46 96 %   21 1459  68         Temp (24hrs), Av °F (36.7 °C), Min:97.3 °F (36.3 °C), Max:98.5 °F (36.9 °C)     O2 Flow Rate (L/min): 2 l/min   O2 Device: Room air    Date 21 07 - 21 0659 21 07 - 21 0659   Shift 4476-8478 5020-5084 24 Hour Total 5154-9160 8562-6475 24 Hour Total   INTAKE   P.O. 1086 0382 2545        P.O. 4478 1974 7882      I.V.(mL/kg/hr) 3834. 8(4) 280(0.3) 4114.8(2.1) 3992.6  3992.6     I.V. 0  0        Phenylephrine Volume 1264.8  1264.8 823.9  823.9     Volume (0.9% sodium chloride infusion)    0  0 Volume (0.9% sodium chloride with KCl 20 mEq/L infusion) 2000 2000 3168.8  3168.8     Volume (meropenem (MERREM) 500 mg in sterile water (preservative free) 10 mL IV syringe) 20 30 50 0  0     Volume (vancomycin (VANCOCIN) 1,250 mg in 0.9% sodium chloride 250 mL (VIAL-MATE)) 500 250 750 0  0     Volume (magnesium sulfate 2 g/50 ml IVPB (premix or compounded)) 50  50      Blood 0  0        Autotransfused 0  0      Other 0  0        Other 0  0      Shift Total(mL/kg) 4859. 8(60.5) 1800(21.7) M0124681. 8(80.4) 3992. 6(48.2)  3992. 6(48.2)   OUTPUT   Urine(mL/kg/hr) 500(0.5) 225(0.2) 725(0.4)        Urine Voided 300 100 400        Urine Occurrence(s)  1 x 1 x        Urine Output (mL) (External Female Catheter 03/26/21) 200 125 325      Emesis/NG output  0 0        Output (ml) (PEG/Gastrostomy Tube 03/23/21)  0 0      Stool 2475 1725 4200        Stool Occurrence(s) 1 x 1 x 2 x        Output (ml) (Ileostomy 03/24/21 Left Abdomen) 2475 1725 4200      Shift Total(mL/kg) 0295(39) 1950(23.6) 4925(59.5)      NET 1884.8 -150 1734.8 3992.6  3992.6   Weight (kg) 80.3 82.8 82.8 82.8 82.8 82.8       General: No acute distress. Alert. Cooperative. Head: Normocephalic. Neck: Supple. Respiratory: CTAB. No w/r/r/c.  Cardiovascular: RRR. Normal S1,S2. No m/r/g.   GI: + bowel sounds. Nontender. No rebound tenderness or guarding. Nondistended. PEG tube with surrounding yeast. Ileostomy bag leaking. Laparoscopic incision open with packing and dressing C/D/I. Extremities:  No LE edema. Distal pulses present. PICC line R arm no erythema.     Data Review:     CBC:  Recent Labs     03/27/21  0428 03/26/21  0356 03/25/21  0011   WBC 14.9* 18.0* 16.7*   HGB 9.9* 9.6* 9.7*   HCT 31.7* 31.9* 31.1*    399 122     Metabolic Panel:  Recent Labs     03/27/21  0428 03/26/21  1739 03/26/21  0356 03/25/21  0011 03/25/21  0011    142 142   < > 142   K 4.1 3.7 3.6   < > 2.3*   * 105 100   < > 96*   CO2 33* 34* 37*   < > 43*   BUN 18 16 19 < > 19   CREA 1.06* 1.18* 1.12*   < > 1.26*   GLU 71 136* 108*   < > 72   CA 7.3* 6.9* 6.9*   < > 7.0*   MG 2.3 2.4 2.8*   < > 1.6   PHOS 2.1* 2.1* 3.1   < > 2.7   ALB  --   --   --   --  2.0*   TBILI  --   --   --   --  0.5   ALT  --   --   --   --  10*    < > = values in this interval not displayed. Micro:  Results     Procedure Component Value Units Date/Time    CULTURE, Kena Specking STAIN [[id number]]  (Abnormal) Collected: 03/25/21 1430    Order Status: Completed Specimen: Abdomen Updated: 03/26/21 1122     Special Requests: NO SPECIAL REQUESTS        GRAM STAIN RARE WBCS SEEN         NO ORGANISMS SEEN        Culture result:       LIGHT POSSIBLE ALPHA STREPTOCOCCUS            CHECKING FOR POSSIBLE  OTHER ORGANISM       CULTURE, BLOOD [[id number]] Collected: 03/24/21 0038    Order Status: Completed Specimen: Blood Updated: 03/27/21 0003     Special Requests: NO SPECIAL REQUESTS        Culture result: NO GROWTH 3 DAYS       CULTURE, BLOOD [[id number]]     Order Status: Canceled Specimen: Blood     CULTURE, BLOOD [[id number]]     Order Status: Canceled Specimen: Blood     URINE CULTURE HOLD SAMPLE [[id number]] Collected: 03/23/21 2150    Order Status: Completed Specimen: Urine from Serum Updated: 03/23/21 2248     Urine culture hold       Urine on hold in Microbiology dept for 2 days. If unpreserved urine is submitted, it cannot be used for addtional testing after 24 hours, recollection will be required. CULTURE, URINE [[id number]] Collected: 03/23/21 2150    Order Status: Completed Specimen: Urine from Clean catch Updated: 03/25/21 1128     Special Requests: NO SPECIAL REQUESTS        [place] Count --        [id number]  COLONIES/mL       Culture result: AEROCOCCUS SANGUINICOLA, KNOWN TO BE SUSCEPTIBLE TO PENCILLIN, CEFOTAXIME, MEROPENEM, VANCOMYCIN, LINEZOLID AND RIFAMPIN         Imaging:  Ct Head Wo Cont    Result Date: 3/23/2021  EXAM: CT HEAD WO CONT INDICATION: AMS COMPARISON: None. CONTRAST: None. TECHNIQUE: Unenhanced CT of the head was performed using 5 mm images. Brain and bone windows were generated. Coronal and sagittal reformats. CT dose reduction was achieved through use of a standardized protocol tailored for this examination and automatic exposure control for dose modulation. FINDINGS: The ventricles and sulci are normal in size, shape and configuration. . There is no significant white matter disease. There is no intracranial hemorrhage, extra-axial collection, or mass effect. The basilar cisterns are open. No CT evidence of acute infarct. The bone windows demonstrate no abnormalities. The visualized portions of the paranasal sinuses and mastoid air cells are clear. No acute findings. Ct Abd Pelv Wo Cont    Result Date: 3/24/2021  INDICATION: Severe sepsis, r/o intraabd source COMPARISON: February 3, 2021 TECHNIQUE: Noncontrast thin axial images were obtained through the abdomen and pelvis. Coronal and sagittal reconstructions were generated. CT dose reduction was achieved through use of a standardized protocol tailored for this examination and automatic exposure control for dose modulation. FINDINGS: LUNG BASES: Moderate to large left pleural effusion with left lower lobe atelectasis. Small right pleural effusion. LIVER: No mass or biliary dilatation. GALLBLADDER: Surgically absent. SPLEEN: No enlargement or lesion. PANCREAS: No mass or ductal dilatation. ADRENALS: No mass. KIDNEYS: No nephrolithiasis or hydronephrosis. GI TRACT:  Left lower quadrant ileostomy. Gastrostomy tube present within the stomach. Dilated fluid-filled small bowel loops throughout the central abdomen. PERITONEUM: Small amount of ascites. There are locules of gas in the ascites in the midline of the central abdomen, extending to 8 large anterior abdominal wall defect at the midline likely related to recent surgery. IV contrast was not administered. APPENDIX: Not visualized.  RETROPERITONEUM: Atherosclerosis without aneurysm. LYMPH NODES:  None enlarged. ADDITIONAL COMMENTS: Anasarca. URINARY BLADDER: Unremarkable. REPRODUCTIVE ORGANS: Unremarkable. LYMPH NODES:  None enlarged. FREE FLUID:  Small amount. BONES: No destructive bone lesion. ADDITIONAL COMMENTS: Anasarca. 1. Interval placement of gastrostomy tube, and left lower quadrant ileostomy, likely accounting for locules of gas within ascites in the central abdomen communicating with a large anterior abdominal wall defect. No definite drainable abscess allowing for lack of IV contrast material. If any more recent CT imaging from [facility] given recent surgery and hospital stay there, may be helpful for direct comparison. 2. Anasarca. Moderate to large left pleural effusion and basilar atelectasis. Trace right pleural effusion. 3. Dilated fluid-filled small bowel loops throughout the central abdomen with wall thickening, may represent ileus or partial obstruction. Again any postoperative comparison examinations would be helpful. Xr Chest Port    Result Date: 3/23/2021  INDICATION: Eval for Infiltrate EXAM:  AP CHEST RADIOGRAPH COMPARISON: February 4, 2021 FINDINGS: AP portable view of the chest demonstrates a normal cardiomediastinal silhouette. Slight increase in small-to-moderate left pleural effusion and basilar atelectasis. No pulmonary edema, consolidation, or pneumothorax. The osseous structures are unremarkable. Slight increase in left small-to-moderate pleural effusion and basilar atelectasis.     .  Medications reviewed  Current Facility-Administered Medications   Medication Dose Route Frequency    0.9% sodium chloride infusion  125 mL/hr IntraVENous CONTINUOUS    collagenase (SANTYL) 250 unit/gram ointment   Topical DAILY    potassium chloride SR (KLOR-CON 10) tablet 40 mEq  40 mEq Oral BID    loperamide (IMODIUM) capsule 4 mg  4 mg Oral AC&HS    colestipoL (COLESTID) tablet 4 g  4 g Oral QID    pantoprazole (PROTONIX) 40 mg in 0.9% sodium chloride 10 mL injection  40 mg IntraVENous Q12H    meropenem (MERREM) 500 mg in sterile water (preservative free) 10 mL IV syringe  0.5 g IntraVENous Q8H    sodium chloride (NS) flush 5-40 mL  5-40 mL IntraVENous Q8H    sodium chloride (NS) flush 5-40 mL  5-40 mL IntraVENous PRN    acetaminophen (TYLENOL) tablet 650 mg  650 mg Oral Q6H PRN    Or    acetaminophen (TYLENOL) suppository 650 mg  650 mg Rectal Q6H PRN    prochlorperazine (COMPAZINE) injection 10 mg  10 mg IntraVENous Q6H PRN    insulin lispro (HUMALOG) injection   SubCUTAneous AC&HS    glucose chewable tablet 16 g  4 Tab Oral PRN    dextrose (D50W) injection syrg 12.5-25 g  12.5-25 g IntraVENous PRN    glucagon (GLUCAGEN) injection 1 mg  1 mg IntraMUSCular PRN    enoxaparin (LOVENOX) injection 80 mg  1 mg/kg SubCUTAneous Q12H    vancomycin (VANCOCIN) 1,250 mg in 0.9% sodium chloride 250 mL (VIAL-MATE)  1,250 mg IntraVENous Q24H    albuterol-ipratropium (DUO-NEB) 2.5 MG-0.5 MG/3 ML  3 mL Nebulization Q4H PRN    diphenoxylate-atropine (LOMOTIL) tablet 2 Tab  2 Tab Oral QID    psyllium husk-aspartame (METAMUCIL FIBER) packet 1 Packet  1 Packet Oral TID WITH MEALS    nystatin (MYCOSTATIN) 100,000 unit/gram powder   Topical BID    PHENYLephrine (ROGER-SYNEPHRINE) 30 mg in 0.9% sodium chloride 250 mL infusion   mcg/min IntraVENous TITRATE    sodium chloride (NS) flush 5-10 mL  5-10 mL IntraVENous PRN         Signed:   Ashley Saldana MD   Resident, Family Medicine

## 2022-06-24 NOTE — PROGRESS NOTES
Phone outreach to the pt, introduced myself and explained my role, called her to explain the rationale behind sched her for a CT of the chest and bx they want to sched for the pt  She started to cry when she was speaking to me (she cried on and off throughout our convo)  She said she did not understand the reason for the test and that she was tired from doing so much without any reasoning behind it, it did not make sense to her and she did not want to do more testing if it was not necessary, she said that she did not want to put herself through so much if this was going to not do much for her, she did not want to be sick all the time but enjoy the remainder of the time she has left  I explained to her that we needed to do CT of the chest to complete the workup and thoroughly explained how cancer could spread to other organs and it was important for us to view the chest as well  I explained also that we needed her to have the bx to find out the exact cancerous tissue in order to know how to properly treat  After some time listening to her and explaining in great length she did understand, she was very appreciative of me calling her and going over all the details, she thanked me for listening to her, she said that she wanted to meet me so I told her that I would meet w her at her consult and placed this on my calendar  We completed the general assessment  She reported feeling weak, tired, sits down more often  Reports losing  a lot of weight  Completed MST form, not scoring high enough for consult  Pt states eating little meals at a time and that she has lost about 10 pounds, reports severe pain glo when she lays down in bed, her pain level 8/10 when she is in bed  I explained to her about pall care and that I was placing a referral to them so they could help her manage her pain as well nutritional intake/weight loss  She was agreeable, she also agreed when I explained I would be placing referral for SW as well   We did not discuss port since she was so overwhelmed already and I did tell her to take her time this weekend to think about whether she wanted to go through w imaging and bx  She mentioned her daughter coming up for about 10 days, she would be visiting around the 18th of July  She thanked me again for my call and for allowing her the time to decide if she wanted to proceed w the testing and bx  I told her I would give her a call on Monday to f/u  I provided her w my contact information so that she could keep and told her to reach out to me if she needs anything

## 2022-06-24 NOTE — TELEPHONE ENCOUNTER
New Patient Intake Form   Patient Details:    Nica Casillas  1935    Appointment Information   Who is calling to schedule? Patient   If not self, what is the caller's name? N/A   DID YOU CONFIRM INSURANCE WITH PATIENT? yes   Referring provider Mohamud Olivares   What is the diagnosis? 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   no     Is there a biopsy ordered or pending? Please specify dates  If yes, route to NN/OCC   no     Is patient aware of diagnosis? yes     Have you had any imaging or labs done? If yes, where? (If imaging done outside of [facility], please remind patient to bring a disk ) Yes MRI 6/14/22, US 6/2/22      If imaging done at outside facility, did you instruct patient to obtain discs and bring to visit? na   Have you been seen by another Oncologist/Hematologist?  If so, who and where? no   Are the records in [facility] or Care Everywhere? yes   Does the patient have records at another facility/hospital?    If yes, Name of facility, city and state where facility is located  na     Did you instruct patient to have records faxed to rightx and provide rightfax number? na   Preferred [facility]   Is the patient willing to be seen by another provider? (This is for breast patients only) na     Did you send new patient paperwork?   Email or mail? na   Miscellaneous Information: 7/18/22 @ 11:20am  Dr Sussy Tiwari

## 2022-06-24 NOTE — PROGRESS NOTES
Pt called very upset stating she does not want to do any further testing or go to anymore appts    She would like to discuss with Dr Bryon Arana as to why all these appts were made

## 2022-06-27 ENCOUNTER — PATIENT OUTREACH (OUTPATIENT)
Dept: HEMATOLOGY ONCOLOGY | Facility: CLINIC | Age: 87
End: 2022-06-27

## 2022-06-27 ENCOUNTER — TELEPHONE (OUTPATIENT)
Dept: FAMILY MEDICINE CLINIC | Facility: HOSPITAL | Age: 87
End: 2022-06-27

## 2022-06-27 NOTE — PROGRESS NOTES
Called pt to f/u on whether or not she wants to go through w liver bx and CT of the chest, pt was happy that I called her today and did state that she is more on the side "of just let this be  If I really thought that this would get better than I would go through with everything, I would but I don't think I am going to get better  If I am not going to get better than why am I going to go through with more tests? I am being realistic and will be okay " Pt does understand that this could be untreatable cancer and she does not want to go through w a bx if this will be painful, she is agreeable to the CT of the chest  She expressed gratitude for me listening to her  She asked if I thought this would get better for her, I told her I could not really say yes or no  That this would be a good thing to discuss w the physician  She understood  I told her that I would keep in touch w her  She thanked me

## 2022-06-28 ENCOUNTER — PATIENT OUTREACH (OUTPATIENT)
Dept: HEMATOLOGY ONCOLOGY | Facility: CLINIC | Age: 87
End: 2022-06-28

## 2022-06-28 NOTE — PROGRESS NOTES
Phone call to the pt, no answer, left , stated that there is an avail appt time for tomorrow morning in [facility] for the pt if she wishes to see a provider for a simple discussion  Left my phone number and waiting for pt to return my call

## 2022-06-30 ENCOUNTER — PATIENT OUTREACH (OUTPATIENT)
Dept: CASE MANAGEMENT | Facility: HOSPITAL | Age: 87
End: 2022-06-30

## 2022-06-30 NOTE — PROGRESS NOTES
Pt returned my call this afternoon and initially was upset, saying that "I'm very agitated with all of this and the constant phone calls"  She says that she is more than likely not going to do any chemo or tx for her cancer, as she feels at her age she will not be able to handle it and does not want to spend her life feeling sick and at appointments  We reviewed her upcoming appointments at her request, she was questioning why she has additional testing ordered and a consult with [facility], saying "this is just all more bills for me, this is just a money grab"  I explained that no one is going to force her to do anything she doesn't want to do, including having tx  I explained that the upcoming CT next week would be to give Dr Rosa Lauren a better picture of what's going on internally with her cancer  I explained that it would be good to at least meet with him for the consult to make sure she has all of the results explained to her to be sure she is comfortable with her decisions to not have tx  After we talked for a little while she did calm down somewhat and says that she plans to keep both appointments  Her friend will be taking her to the CT and her daughter will be with her at [address] [place] [address] Dr  She denies any other needs at this time and I thanked her for calling me back  MSW will remain available to her as needed moving forward

## 2022-07-01 ENCOUNTER — PATIENT OUTREACH (OUTPATIENT)
Dept: HEMATOLOGY ONCOLOGY | Facility: CLINIC | Age: 87
End: 2022-07-01

## 2022-07-01 NOTE — PROGRESS NOTES
Chart florin,r dede note from [facility], pt tired of multiple phone calls, holding off on calling her again, she did agree to keep sched'd appts at this time  Pt has my contact info in case she needs to reach out, will follow up w her later on this month

## 2022-07-05 ENCOUNTER — HOSPITAL ENCOUNTER (OUTPATIENT)
Dept: CT IMAGING | Facility: HOSPITAL | Age: 87
Discharge: HOME | End: 2022-07-05
Attending: STUDENT IN AN ORGANIZED HEALTH CARE EDUCATION/TRAINING PROGRAM
Payer: COMMERCIAL

## 2022-07-05 ENCOUNTER — PATIENT OUTREACH (OUTPATIENT)
Dept: HEMATOLOGY ONCOLOGY | Facility: CLINIC | Age: 87
End: 2022-07-05

## 2022-07-05 DIAGNOSIS — K86.89 MASS OF PANCREAS: ICD-10-CM

## 2022-07-05 PROCEDURE — G1004 CDSM NDSC: HCPCS

## 2022-07-05 PROCEDURE — 71250 CT THORAX DX C-: CPT

## 2022-07-05 NOTE — PROGRESS NOTES
Phone call to the pt, no answer, left VM, stated my name, title, and reason for call  Waiting for call back  Want to discuss if she will go through w biopsy to get tx

## 2022-07-11 ENCOUNTER — PATIENT OUTREACH (OUTPATIENT)
Dept: HEMATOLOGY ONCOLOGY | Facility: CLINIC | Age: 87
End: 2022-07-11

## 2022-07-11 NOTE — PROGRESS NOTES
Return call to the pt, she stated in her VM " I really don't know if I want to go in or not, bye"  I left her a VM this morning asking her to give me a call back to let me know what she has decided (she has been back and forth on what she wants to do)  Appears there have been cancelled appts except w Dr Sofiya Brown  Provided my call back number

## 2022-07-13 ENCOUNTER — TELEPHONE (OUTPATIENT)
Dept: HEMATOLOGY ONCOLOGY | Facility: CLINIC | Age: 87
End: 2022-07-13

## 2022-07-13 NOTE — TELEPHONE ENCOUNTER
07/13/22    Pt was scheduled to see Dr Gabriela Miller on 7/18 with no biopsy performed  Pt also canceled her MRI abd w/ con which was highly recommended  I called back and spoke with pt asking if she wants to seek for chemo or comfort care  Per SW's note, pt was hesitant to receive chemotherapy  Pt was VERY upset and tearful  She is very confused with what her next best move is  I explained to her:    1  If she opts to receive treatment, a definitive biopsy is required  It will determine the treatment plan  Although her imaging tests showed the biggest possibly that she may have metastatic pancreatic cancer  2  If she opts to receive comfort/ palliative care, we will cancel her appt with oncology  Instead, she can f/u closely with Dr Kindra Faulkner and Palliative care  Pt cried again and stated she would like still to speak with someone to let her know what she should do next  Her daughter will come from [facility] next Monday and she really wants to keep her appt even after I explained to her that this visit may not be fruitful w/o biopsy  Pt verbalized understanding and asked me to keep it still  For somehow reason, pt never set up an appt for IR biopsy  I will leave this AFTER her appt with Dr Gabriela Miller

## 2022-07-18 ENCOUNTER — TELEPHONE (OUTPATIENT)
Dept: HEMATOLOGY ONCOLOGY | Facility: CLINIC | Age: 87
End: 2022-07-18

## 2022-07-18 ENCOUNTER — HOME CARE VISIT (OUTPATIENT)
Dept: HOME HEALTH SERVICES | Facility: HOME HEALTHCARE | Age: 87
End: 2022-07-18
Payer: MEDICARE

## 2022-07-18 ENCOUNTER — HOSPICE ADMISSION (OUTPATIENT)
Dept: HOME HOSPICE | Facility: HOSPICE | Age: 87
End: 2022-07-18
Payer: MEDICARE

## 2022-07-18 ENCOUNTER — CONSULT (OUTPATIENT)
Dept: HEMATOLOGY ONCOLOGY | Facility: HOSPITAL | Age: 87
End: 2022-07-18
Payer: COMMERCIAL

## 2022-07-18 VITALS
RESPIRATION RATE: 14 BRPM | HEIGHT: 61 IN | TEMPERATURE: 97.8 F | SYSTOLIC BLOOD PRESSURE: 110 MMHG | OXYGEN SATURATION: 98 % | WEIGHT: 105 LBS | BODY MASS INDEX: 19.83 KG/M2 | HEART RATE: 78 BPM | DIASTOLIC BLOOD PRESSURE: 64 MMHG

## 2022-07-18 DIAGNOSIS — K86.89 MASS OF PANCREAS: Primary | ICD-10-CM

## 2022-07-18 DIAGNOSIS — R59.1 LYMPHADENOPATHY: ICD-10-CM

## 2022-07-18 PROCEDURE — 1160F RVW MEDS BY RX/DR IN RCRD: CPT | Performed by: INTERNAL MEDICINE

## 2022-07-18 PROCEDURE — 99205 OFFICE O/P NEW HI 60 MIN: CPT | Performed by: INTERNAL MEDICINE

## 2022-07-18 NOTE — TELEPHONE ENCOUNTER
CALL RETURN FORM   Reason for patient call? Anna from [facility] calling regarding patients hospice order  Patients address is a PO Box in an area that they do not service  They are calling to confirm the patients actual home address to confirm whether or not they service her area   Patient's primary oncologist? Dr Sofiya Brown   Name of person the patient was calling for? Dr Sofiya Brown   Any additional information to add, if applicable? Call back at [phone]   Informed patient that the message will be forwarded to the team and someone will get back to them as soon as possible    Did you relay this information to the patient?  Yes

## 2022-07-18 NOTE — TELEPHONE ENCOUNTER
Returned call to hospice RN Anna after speaking to pt daughter confirming new address of [address] in [place]   They can service hospice in this area and will act upon the consult

## 2022-07-18 NOTE — PROGRESS NOTES
Oncology Outpatient Consult Note  Marcello Johnson 81 y o  female MRN: @ Encounter: [id number]        Date:  7/18/2022        CC:  What appears to be metastatic pancreatic cancer      HPI:  Marcello Johnson is seen for initial consultation 7/18/2022 regarding newly diagnosed pancreatic mass with liver lesions and possible lung lesions which appears to be metastatic pancreatic cancer  Lymphoma is lower down on the list of differentials  Regardless the patient has been going back and forth about even consideration of doing anything for this  She finally came in today with her daughter who has come in from [facility] to discuss this  She states she has some epigastric pain and has been taking tramadol  She takes 1 every night  Her appetite had gone down but is now better  Denies any nausea denies any vomiting denies any diarrhea  Overall otherwise is doing reasonably well  Goes to [employer] regularly and is still driving during the day as she does not take any pain medication  The rest of her 14 point review of systems today was negative  She understands she is not to drive on any narcotics  Test Results:    Imaging: CT chest wo contrast    Result Date: 7/8/2022  Narrative: CT CHEST WITHOUT IV CONTRAST INDICATION:   K86 89: Other specified diseases of pancreas  "Metastatic disease evaluation, colon cancer, metastatic staging " Per my review of the medical record, the abdominal MRI shows a pancreatic head mass suspicious for pancreatic cancer with liver metastases  COMPARISON:  Abdomen MRI from 6/14/2022, abdomen CT from 5/12/2022  TECHNIQUE: Chest CT without intravenous contrast   Axial, sagittal, coronal 2D reformats and coronal MIPS from source data  Radiation dose length product (DLP):  201 mGy-cm   Radiation dose exposure minimized using iterative reconstruction and automated exposure control  FINDINGS: LUNGS:  Several bilateral predominantly peripheral " shaggy" nodules    Those in the lower lungs are new since 5/12/2022  AIRWAYS: No significant filling defects  PLEURA:  Unremarkable  HEART/GREAT VESSELS:  Normal heart size  Severe coronary artery calcification indicating atherosclerotic heart disease  Trace pericardial effusion  MEDIASTINUM AND CLIFF:  Large hiatal hernia  CHEST WALL AND LOWER NECK: Unremarkable  UPPER ABDOMEN:  Ill-defined low-attenuation lesion in the subcapsular posterior segment right hepatic lobe and left hepatic lobe due to metastases  Pancreatic head mass is suboptimally demonstrated  Cholelithiasis  OSSEOUS STRUCTURES: Mild degenerative disease in the spine with moderate curvature  Impression: Several ill-defined irregular "shaggy pulmonary nodules  Those in the lower lungs are new since May 2022  Given their rapid development, they could be inflammatory, but pancreatic cancer metastases can have such an appearance  Liver metastases and pancreatic head mass suboptimally demonstrated  Workstation performed: [id number]       Labs:   Lab Results   Component Value Date    WBC 9 19 05/12/2022    HGB 11 9 05/12/2022    HCT 38 6 05/12/2022    MCV 86 05/12/2022     05/12/2022     Lab Results   Component Value Date    K 3 9 05/12/2022     05/12/2022    CO2 28 05/12/2022    BUN 22 05/12/2022    CREATININE 1 26 05/12/2022    CALCIUM 9 3 05/12/2022    AST 16 05/12/2022    ALT 15 05/12/2022    ALKPHOS 106 05/12/2022    EGFR 38 05/12/2022     ROS: As stated in history of present illness otherwise her 14 point review of systems today was negative  Active Problems:   Patient Active Problem List   Diagnosis    Acute posthemorrhagic anemia    Cough    Constipation, unspecified    Difficulty in walking, not elsewhere classified    Functional dyspepsia    Generalized muscle weakness    Uterovaginal prolapse, unspecified    Vitamin deficiency, unspecified    Stage 3a chronic kidney disease ([address] )       Past Medical History: No past medical history on file      Surgical History:   Past Surgical History:   Procedure Laterality Date    FEMUR FRACTURE SURGERY         Family History:  No family history on file  Social History:   Social History     Socioeconomic History    Marital status:      Spouse name: Not on file    Number of children: Not on file    Years of education: Not on file    Highest education level: Not on file   Occupational History    Not on file   Tobacco Use    Smoking status: Never Smoker    Smokeless tobacco: Never Used   Vaping Use    Vaping Use: Never used   Substance and Sexual Activity    Alcohol use: Not Currently    Drug use: Not on file    Sexual activity: Not on file   Other Topics Concern    Not on file   Social History Narrative    Not on file     Social Determinants of Health     Financial Resource Strain: Not on file   Food Insecurity: Not on file   Transportation Needs: Not on file   Physical Activity: Not on file   Stress: Not on file   Social Connections: Not on file   Intimate Partner Violence: Not on file   Housing Stability: Not on file       Current Medications:   Current Outpatient Medications   Medication Sig Dispense Refill    traMADol (ULTRAM) 50 mg tablet take 1 tablet by mouth every 12 hours if needed for MODERATE TO SEVERE PAIN 30 tablet 1     No current facility-administered medications for this visit  Allergies: Allergies   Allergen Reactions    Latex Hives         Physical Exam:    Body surface area is 1 44 meters squared      Wt Readings from Last 3 Encounters:   07/18/22 47 6 kg (105 lb)   06/01/22 52 1 kg (114 lb 12 8 oz)   05/12/22 55 8 kg (123 lb)        Temp Readings from Last 3 Encounters:   07/18/22 97 8 °F (36 6 °C) (Temporal)   05/12/22 98 4 °F (36 9 °C) (Temporal)        BP Readings from Last 3 Encounters:   07/18/22 110/64   06/01/22 138/68   05/12/22 168/77         Pulse Readings from Last 3 Encounters:   07/18/22 78   06/01/22 70   05/12/22 75       Physical Exam     Constitutional General appearance: No acute distress,   Eyes   Conjunctiva and lids: No swelling, erythema or discharge  Pupils and irises: Equal, round and reactive to light  Ears, Nose, Mouth, and Throat   External inspection of ears and nose: Normal     Nasal mucosa, septum, and turbinates: Normal without edema or erythema  Oropharynx: Normal with no erythema, edema, exudate or lesions  Pulmonary   Respiratory effort: No increased work of breathing or signs of respiratory distress  Auscultation of lungs: Clear to auscultation  Cardiovascular   Palpation of heart: Normal PMI, no thrills  Auscultation of heart: Normal rate and rhythm, normal S1 and S2, without murmurs  Examination of extremities for edema and/or varicosities: Normal     Carotid pulses: Normal     Abdomen   Abdomen: Non-tender, no masses  Liver and spleen: No hepatomegaly or splenomegaly  Lymphatic   Palpation of lymph nodes in neck: No lymphadenopathy  Musculoskeletal   Gait and station: Normal     Digits and nails: Normal without clubbing or cyanosis  Inspection/palpation of joints, bones, and muscles: Normal     Skin   Skin and subcutaneous tissue: Normal without rashes or lesions  Neurologic   Cranial nerves: Cranial nerves 2-12 intact  Sensation: No sensory loss  Psychiatric   Orientation to person, place, and time: Normal     Mood and affect: Normal           Assessment/ Plan: This is a pleasant 67-year-old female who was diagnosed with pancreatic mass with liver lesions and possible lung lesions  She has resisted a biopsy up to this point  Her daughter accompanies her today along with a friend  We had a detailed discussion about possibilities including metastatic pancreatic cancer and what it means  The patient states she is not interested in getting chemotherapy  She states she just wishes to live her life as comfortably as possible    Was initially resistant to hospice but after understanding that they would not come every day unless she needed them and understanding that she would be in control in terms of scheduling her visits with them she agreed to hospice  Her daughter and her friend also agreed  I will set her up for home hospice and a referral   She will see me as needed  She will continue to follow with her primary care physician and the hospice team   If she has any questions she will call our office  Both the patient and her family were in agreement that she did not wish to pursue chemotherapy at 81years of age for stage IV disease probably pancreatic cancer which I think is reasonable  They understand there is a chance this could be another malignancy including lymphoma and that a biopsy would be required to ascertain the real diagnosis but she does not wish to get a biopsy or pursue aggressive measures at 81years of age  Goals and Barriers:  Current Goal:  Prolong Survival from probable pancreatic Cancer  Barriers: None  Patient's Capacity to Self Care:  Patient able to self care  Portions of the record may have been created with voice recognition software  Occasional wrong word or "sound a like" substitutions may have occurred due to the inherent limitations of voice recognition software  Read the chart carefully and recognize, using context, where substitutions have occurred

## 2022-07-19 ENCOUNTER — OFFICE VISIT (OUTPATIENT)
Dept: FAMILY MEDICINE CLINIC | Facility: HOSPITAL | Age: 87
End: 2022-07-19
Payer: COMMERCIAL

## 2022-07-19 VITALS
WEIGHT: 104 LBS | OXYGEN SATURATION: 99 % | SYSTOLIC BLOOD PRESSURE: 118 MMHG | HEART RATE: 67 BPM | HEIGHT: 61 IN | DIASTOLIC BLOOD PRESSURE: 60 MMHG | BODY MASS INDEX: 19.63 KG/M2

## 2022-07-19 DIAGNOSIS — N18.31 STAGE 3A CHRONIC KIDNEY DISEASE (HCC): ICD-10-CM

## 2022-07-19 DIAGNOSIS — R63.4 WEIGHT LOSS: ICD-10-CM

## 2022-07-19 DIAGNOSIS — K86.89 MASS OF PANCREAS: Primary | ICD-10-CM

## 2022-07-19 DIAGNOSIS — R59.1 LYMPHADENOPATHY: ICD-10-CM

## 2022-07-19 DIAGNOSIS — Z51.5 HOSPICE CARE: ICD-10-CM

## 2022-07-19 PROCEDURE — 99214 OFFICE O/P EST MOD 30 MIN: CPT | Performed by: STUDENT IN AN ORGANIZED HEALTH CARE EDUCATION/TRAINING PROGRAM

## 2022-07-19 RX ORDER — FEEDER CONTAINER WITH PUMP SET
1 EACH MISCELLANEOUS DAILY
Qty: 7110 ML | Refills: 0 | Status: SHIPPED | OUTPATIENT
Start: 2022-07-19 | End: 2022-08-18

## 2022-07-19 RX ORDER — MIRTAZAPINE 7.5 MG/1
7.5 TABLET, FILM COATED ORAL
Qty: 30 TABLET | Refills: 5 | Status: SHIPPED | OUTPATIENT
Start: 2022-07-19

## 2022-07-19 NOTE — PROGRESS NOTES
[facility],     Assessment/Plan:      Diagnosis ICD-10-CM Associated Orders   1  Mass of pancreas  K86 89 mirtazapine (REMERON) 7 5 MG tablet     Nutritional Supplements (Ensure High Protein) LIQD   2  Lymphadenopathy  R59 1 mirtazapine (REMERON) 7 5 MG tablet     Nutritional Supplements (Ensure High Protein) LIQD   3  Stage 3a chronic kidney disease (HCC)  N18 31 mirtazapine (REMERON) 7 5 MG tablet     Nutritional Supplements (Ensure High Protein) LIQD   4  Weight loss  R63 4 mirtazapine (REMERON) 7 5 MG tablet     Nutritional Supplements (Ensure High Protein) LIQD   5  Hospice care  Z51 5 mirtazapine (REMERON) 7 5 MG tablet     Nutritional Supplements (Ensure High Protein) LIQD      Patient now pursuing hospice care, given her mass in the pancreas and likely metastatic lesions  Seen by Dr Neal Luz of oncology yesterday   Try to add ensures to daily regimen to keep weight from going any lower, to avoid things like bedsores and pressure ulcers as things get harder for her with her diagnosis  Remeron also added, and patient will continue to consider whether not she would like to use it for her mood, appetite  No follow-ups on file   Patient may call or return to office with any questions or concerns  ______________________________________________________________________  Subjective:     Patient ID: Glendy Oliver is a 81 y o  female  HPI  Glendy Oliver  Chief Complaint   Patient presents with   [address] starting, liason working on it  Is in service area  Walker at home, canes when needed also  Twin mattress, one level house  BMI Counseling: Body mass index is 19 65 kg/m²  The BMI is normal       The following portions of the patient's history were reviewed and updated as appropriate: allergies, current medications, past medical history and problem list     Review of Systems   Constitutional: Negative for chills and fever     HENT: Negative for congestion and rhinorrhea  Respiratory: Negative for cough and shortness of breath  Cardiovascular: Negative for chest pain and leg swelling  Gastrointestinal: Positive for diarrhea  Negative for abdominal pain, blood in stool, constipation, nausea and vomiting  Neurological: Negative for dizziness, light-headedness and headaches  Objective:      Vitals:    07/19/22 1131   BP: 118/60   Pulse: 67   SpO2: 99%      Physical Exam  Vitals reviewed  Constitutional:       General: She is not in acute distress  Appearance: Normal appearance  She is well-developed and normal weight  She is not ill-appearing  HENT:      Head: Normocephalic and atraumatic  Eyes:      General: No scleral icterus  Right eye: No discharge  Left eye: No discharge  Cardiovascular:      Rate and Rhythm: Normal rate and regular rhythm  Pulses: Normal pulses  Heart sounds: Normal heart sounds  No murmur heard  Pulmonary:      Effort: Pulmonary effort is normal  No respiratory distress  Breath sounds: Normal breath sounds  No stridor  No wheezing  Musculoskeletal:      Cervical back: Normal range of motion  Right lower leg: No edema  Left lower leg: No edema  Skin:     General: Skin is warm  Neurological:      Mental Status: She is alert and oriented to person, place, and time  Gait: Gait normal    Psychiatric:         Mood and Affect: Mood normal          Behavior: Behavior normal          Thought Content: Thought content normal          Judgment: Judgment normal            Portions of the record may have been created with voice recognition software  Occasional wrong word or "sound alike" substitutions may have occurred due to the inherent limitations of voice recognition software  Please review the chart carefully and recognize, using context, where substitutions/typographical errors may have occurred

## 2022-07-29 ENCOUNTER — HOME CARE VISIT (OUTPATIENT)
Dept: HOME HOSPICE | Facility: HOSPICE | Age: 87
End: 2022-07-29
Payer: MEDICARE

## 2022-07-29 ENCOUNTER — HOME CARE VISIT (OUTPATIENT)
Dept: HOME HEALTH SERVICES | Facility: HOME HEALTHCARE | Age: 87
End: 2022-07-29
Payer: MEDICARE

## 2022-07-29 VITALS
HEIGHT: 61 IN | RESPIRATION RATE: 18 BRPM | WEIGHT: 105 LBS | SYSTOLIC BLOOD PRESSURE: 130 MMHG | BODY MASS INDEX: 19.83 KG/M2 | HEART RATE: 68 BPM | DIASTOLIC BLOOD PRESSURE: 78 MMHG

## 2022-07-29 DIAGNOSIS — C25.9 PRIMARY PANCREATIC CANCER WITH METASTASIS TO OTHER SITE (HCC): ICD-10-CM

## 2022-07-29 DIAGNOSIS — Z51.5 HOSPICE CARE PATIENT: Primary | ICD-10-CM

## 2022-07-29 PROCEDURE — 10330057 MEDICATION, GENERAL

## 2022-07-29 PROCEDURE — G0299 HHS/HOSPICE OF RN EA 15 MIN: HCPCS

## 2022-07-29 PROCEDURE — T2042 HOSPICE ROUTINE HOME CARE: HCPCS

## 2022-07-29 RX ORDER — OXYCODONE HYDROCHLORIDE 5 MG/1
5 TABLET ORAL EVERY 4 HOURS PRN
Qty: 20 TABLET | Refills: 0 | Status: SHIPPED | OUTPATIENT
Start: 2022-07-29

## 2022-07-29 RX ORDER — LORAZEPAM 0.5 MG/1
0.5 TABLET ORAL EVERY 6 HOURS PRN
Qty: 12 TABLET | Refills: 0 | Status: SHIPPED | OUTPATIENT
Start: 2022-07-29

## 2022-07-29 NOTE — PROGRESS NOTES
7/29/2022 4:57 PM  [facility] patient requests emergency fill until [facility] order arrives and [facility] medications  Filled electronically via Epic as per [place] State Law  Requested Prescriptions     Signed Prescriptions Disp Refills    oxyCODONE (Roxicodone) 5 immediate release tablet 20 tablet 0     Sig: Take 1 tablet (5 mg total) by mouth every 4 (four) hours as needed (pain / SOB)    LORazepam (Ativan) 0 5 mg tablet 12 tablet 0     Sig: Take 1 tablet (0 5 mg total) by mouth every 6 (six) hours as needed for anxiety (restlessness / SOB)       Ronni Claude, [address] Answering Service: [phone]  You can find me on [employer]!

## 2022-07-30 PROCEDURE — T2042 HOSPICE ROUTINE HOME CARE: HCPCS

## 2022-07-31 PROCEDURE — T2042 HOSPICE ROUTINE HOME CARE: HCPCS

## 2022-08-01 ENCOUNTER — HOME CARE VISIT (OUTPATIENT)
Dept: HOME HEALTH SERVICES | Facility: HOME HEALTHCARE | Age: 87
End: 2022-08-01
Payer: MEDICARE

## 2022-08-01 ENCOUNTER — HOME CARE VISIT (OUTPATIENT)
Dept: HOME HOSPICE | Facility: HOSPICE | Age: 87
End: 2022-08-01
Payer: MEDICARE

## 2022-08-01 VITALS
HEART RATE: 79 BPM | DIASTOLIC BLOOD PRESSURE: 58 MMHG | SYSTOLIC BLOOD PRESSURE: 98 MMHG | TEMPERATURE: 99.3 F | OXYGEN SATURATION: 97 % | RESPIRATION RATE: 20 BRPM

## 2022-08-01 PROCEDURE — T2042 HOSPICE ROUTINE HOME CARE: HCPCS

## 2022-08-01 PROCEDURE — G0299 HHS/HOSPICE OF RN EA 15 MIN: HCPCS

## 2022-08-02 ENCOUNTER — HOME CARE VISIT (OUTPATIENT)
Dept: HOME HOSPICE | Facility: HOSPICE | Age: 87
End: 2022-08-02
Payer: MEDICARE

## 2022-08-02 PROCEDURE — T2042 HOSPICE ROUTINE HOME CARE: HCPCS

## 2022-08-03 PROCEDURE — T2042 HOSPICE ROUTINE HOME CARE: HCPCS

## 2022-08-04 ENCOUNTER — HOME CARE VISIT (OUTPATIENT)
Dept: HOME HEALTH SERVICES | Facility: HOME HEALTHCARE | Age: 87
End: 2022-08-04

## 2022-08-04 PROCEDURE — G0299 HHS/HOSPICE OF RN EA 15 MIN: HCPCS

## 2022-08-04 PROCEDURE — T2042 HOSPICE ROUTINE HOME CARE: HCPCS

## 2022-08-05 PROCEDURE — T2042 HOSPICE ROUTINE HOME CARE: HCPCS

## 2022-08-06 VITALS
SYSTOLIC BLOOD PRESSURE: 150 MMHG | TEMPERATURE: 97.5 F | DIASTOLIC BLOOD PRESSURE: 82 MMHG | HEART RATE: 92 BPM | RESPIRATION RATE: 20 BRPM | OXYGEN SATURATION: 98 %

## 2022-08-06 PROCEDURE — T2042 HOSPICE ROUTINE HOME CARE: HCPCS

## 2022-08-07 PROCEDURE — T2042 HOSPICE ROUTINE HOME CARE: HCPCS

## 2022-08-08 ENCOUNTER — TELEPHONE (OUTPATIENT)
Dept: FAMILY MEDICINE CLINIC | Facility: HOSPITAL | Age: 87
End: 2022-08-08

## 2022-08-08 ENCOUNTER — HOME CARE VISIT (OUTPATIENT)
Dept: HOME HEALTH SERVICES | Facility: HOME HEALTHCARE | Age: 87
End: 2022-08-08
Payer: MEDICARE

## 2022-08-08 PROCEDURE — G0299 HHS/HOSPICE OF RN EA 15 MIN: HCPCS

## 2022-08-08 PROCEDURE — T2042 HOSPICE ROUTINE HOME CARE: HCPCS

## 2022-08-08 NOTE — TELEPHONE ENCOUNTER
Pt's friend calling on behalf of pt  At last visit was discussed that it was ok to continue care with Dr Deb Washington, but was told by hospice nurse that it is probably not billable  Pt would really like to see Dr Deb Washington again and would like to know what the cost or process would be or if done as courtesy   PCB pt contact Lorin Crandall @ [phone]

## 2022-08-09 PROCEDURE — T2042 HOSPICE ROUTINE HOME CARE: HCPCS

## 2022-08-10 PROCEDURE — T2042 HOSPICE ROUTINE HOME CARE: HCPCS

## 2022-08-11 ENCOUNTER — HOME CARE VISIT (OUTPATIENT)
Dept: HOME HEALTH SERVICES | Facility: HOME HEALTHCARE | Age: 87
End: 2022-08-11
Payer: MEDICARE

## 2022-08-11 PROCEDURE — T2042 HOSPICE ROUTINE HOME CARE: HCPCS

## 2022-08-11 PROCEDURE — G0299 HHS/HOSPICE OF RN EA 15 MIN: HCPCS

## 2022-08-11 NOTE — TELEPHONE ENCOUNTER
I don't think it will be covered by her insurance  When they go on hospice I think there medicare is assigned to hospice only

## 2022-08-12 VITALS
SYSTOLIC BLOOD PRESSURE: 128 MMHG | DIASTOLIC BLOOD PRESSURE: 74 MMHG | OXYGEN SATURATION: 96 % | TEMPERATURE: 98.2 F | RESPIRATION RATE: 22 BRPM | HEART RATE: 70 BPM

## 2022-08-12 PROCEDURE — T2042 HOSPICE ROUTINE HOME CARE: HCPCS

## 2022-08-13 PROCEDURE — T2042 HOSPICE ROUTINE HOME CARE: HCPCS

## 2022-08-14 PROCEDURE — T2042 HOSPICE ROUTINE HOME CARE: HCPCS

## 2022-08-15 ENCOUNTER — HOME CARE VISIT (OUTPATIENT)
Dept: HOME HOSPICE | Facility: HOSPICE | Age: 87
End: 2022-08-15
Payer: MEDICARE

## 2022-08-15 ENCOUNTER — HOME CARE VISIT (OUTPATIENT)
Dept: HOME HEALTH SERVICES | Facility: HOME HEALTHCARE | Age: 87
End: 2022-08-15
Payer: MEDICARE

## 2022-08-15 ENCOUNTER — TELEPHONE (OUTPATIENT)
Dept: FAMILY MEDICINE CLINIC | Facility: HOSPITAL | Age: 87
End: 2022-08-15

## 2022-08-15 VITALS
TEMPERATURE: 98.7 F | SYSTOLIC BLOOD PRESSURE: 92 MMHG | OXYGEN SATURATION: 97 % | DIASTOLIC BLOOD PRESSURE: 48 MMHG | HEART RATE: 72 BPM | RESPIRATION RATE: 18 BRPM

## 2022-08-15 PROCEDURE — T2042 HOSPICE ROUTINE HOME CARE: HCPCS

## 2022-08-15 PROCEDURE — G0299 HHS/HOSPICE OF RN EA 15 MIN: HCPCS

## 2022-08-16 PROCEDURE — T2042 HOSPICE ROUTINE HOME CARE: HCPCS

## 2022-08-16 NOTE — CASE COMMUNICATION
Ship to    Home   Branch: [place]         4in Ace [id number]   4  Adapt skin barrier no sting wipes 50 per box  [id number]   1    Cleansers  [place] [id number]   1    Dry Dressings   Gauze 4x4 N/S 200 4x4s per unit  [id number]   1  Gauze [place] stretch roll 4inch n/s 12 rolls per unit  [id number]  1  Tape  Transpore white 1 in [id number]   1        Gloves  Large [id number] 1

## 2022-08-16 NOTE — CASE COMMUNICATION
Pt would like to try an aide 1 day a week on Thursdays  If possible can she have [facility] please, she is very hesitant and will need some TLC

## 2022-08-17 VITALS
OXYGEN SATURATION: 99 % | SYSTOLIC BLOOD PRESSURE: 122 MMHG | DIASTOLIC BLOOD PRESSURE: 78 MMHG | HEART RATE: 78 BPM | RESPIRATION RATE: 20 BRPM

## 2022-08-17 PROCEDURE — 10330064 WIPE, SKIN GEL PROT DRSNG (50/BX)

## 2022-08-17 PROCEDURE — 10330064 TAPE, ADHSV TRANSPORE WHT 1" (12RL/BX 10

## 2022-08-17 PROCEDURE — 10330064 CLEANSER, WND SEA-CLEANS 6OZ  COLPLT

## 2022-08-17 PROCEDURE — 10330064 BANDAGE, CNFRM 4"X4.1YDS N/S LF (12RL/BG

## 2022-08-17 PROCEDURE — T2042 HOSPICE ROUTINE HOME CARE: HCPCS

## 2022-08-17 PROCEDURE — 10330064 GLOVE, EXAM VNYL LG N/S (100/BX 10BX/CS)

## 2022-08-17 PROCEDURE — 10330064 SPONGE, GAUZE 8PLY N/S 4"X4" (200/PK 20P

## 2022-08-17 PROCEDURE — 10330064 BANDAGE, ELAS SLF-CLSR PREM N/S LF 4X5YD

## 2022-08-18 ENCOUNTER — HOME CARE VISIT (OUTPATIENT)
Dept: HOME HEALTH SERVICES | Facility: HOME HEALTHCARE | Age: 87
End: 2022-08-18
Payer: MEDICARE

## 2022-08-18 PROCEDURE — T2042 HOSPICE ROUTINE HOME CARE: HCPCS

## 2022-08-18 PROCEDURE — G0156 HHCP-SVS OF AIDE,EA 15 MIN: HCPCS

## 2022-08-18 PROCEDURE — G0299 HHS/HOSPICE OF RN EA 15 MIN: HCPCS

## 2022-08-19 VITALS
RESPIRATION RATE: 18 BRPM | OXYGEN SATURATION: 98 % | TEMPERATURE: 97.8 F | SYSTOLIC BLOOD PRESSURE: 92 MMHG | HEART RATE: 78 BPM | DIASTOLIC BLOOD PRESSURE: 52 MMHG

## 2022-08-19 PROCEDURE — T2042 HOSPICE ROUTINE HOME CARE: HCPCS

## 2022-08-20 PROCEDURE — T2042 HOSPICE ROUTINE HOME CARE: HCPCS

## 2022-08-21 PROCEDURE — T2042 HOSPICE ROUTINE HOME CARE: HCPCS

## 2022-08-22 ENCOUNTER — HOME CARE VISIT (OUTPATIENT)
Dept: HOME HEALTH SERVICES | Facility: HOME HEALTHCARE | Age: 87
End: 2022-08-22
Payer: MEDICARE

## 2022-08-22 VITALS
DIASTOLIC BLOOD PRESSURE: 52 MMHG | TEMPERATURE: 97.8 F | RESPIRATION RATE: 20 BRPM | SYSTOLIC BLOOD PRESSURE: 98 MMHG | OXYGEN SATURATION: 96 % | HEART RATE: 91 BPM

## 2022-08-22 PROCEDURE — T2042 HOSPICE ROUTINE HOME CARE: HCPCS

## 2022-08-22 PROCEDURE — G0299 HHS/HOSPICE OF RN EA 15 MIN: HCPCS

## 2022-08-23 PROCEDURE — T2042 HOSPICE ROUTINE HOME CARE: HCPCS

## 2022-08-24 ENCOUNTER — HOME CARE VISIT (OUTPATIENT)
Dept: HOME HOSPICE | Facility: HOSPICE | Age: 87
End: 2022-08-24
Payer: MEDICARE

## 2022-08-24 PROCEDURE — T2042 HOSPICE ROUTINE HOME CARE: HCPCS

## 2022-08-25 ENCOUNTER — HOME CARE VISIT (OUTPATIENT)
Dept: HOME HEALTH SERVICES | Facility: HOME HEALTHCARE | Age: 87
End: 2022-08-25
Payer: MEDICARE

## 2022-08-25 ENCOUNTER — HOME CARE VISIT (OUTPATIENT)
Dept: HOME HOSPICE | Facility: HOSPICE | Age: 87
End: 2022-08-25
Payer: MEDICARE

## 2022-08-25 PROCEDURE — G0156 HHCP-SVS OF AIDE,EA 15 MIN: HCPCS

## 2022-08-25 PROCEDURE — T2042 HOSPICE ROUTINE HOME CARE: HCPCS

## 2022-08-26 ENCOUNTER — HOME CARE VISIT (OUTPATIENT)
Dept: HOME HOSPICE | Facility: HOSPICE | Age: 87
End: 2022-08-26
Payer: MEDICARE

## 2022-08-26 PROCEDURE — T2042 HOSPICE ROUTINE HOME CARE: HCPCS

## 2022-08-26 NOTE — CASE COMMUNICATION
Ship to clinician to take to pt  Branch: [employer] [id number]    12/pk   4                    Underpad, reusable 36x36  [id number]

## 2022-08-26 NOTE — CASE COMMUNICATION
Ship to Clinician  Branch:[employer] [facility] [employer]  16fr 5cc cath [id number]   1  Syringe 10cc [id number]  2   10cc cath tray [id number]  1  Drain bag 2000cc [id number] 1    Statlock  NOT STOCKED [id number]   2

## 2022-08-27 PROCEDURE — T2042 HOSPICE ROUTINE HOME CARE: HCPCS

## 2022-08-28 PROCEDURE — 10330064 CATHETER, FOLEY 2WAY 5CC 16FR (10/BX)

## 2022-08-28 PROCEDURE — 10330064 SYRINGE, LL 10CC (100/BX 12BX/CS)

## 2022-08-28 PROCEDURE — 10330064 CATH TRAY, FOLEY SYR 10CC (20/CS)

## 2022-08-28 PROCEDURE — 10330064 CATH SECURE, STATLOCK FOLEY SWIVEL SIL P

## 2022-08-28 PROCEDURE — 10330064 UNDERPAD, REUSE 36X36 1DZ     BECKCL

## 2022-08-28 PROCEDURE — 10330064 BAG, URINE ANTI-REFLUX 2000ML (20/CS)

## 2022-08-28 PROCEDURE — T2042 HOSPICE ROUTINE HOME CARE: HCPCS

## 2022-08-28 PROCEDURE — 10330064 BRIEF, WINGS CHOICE+ QUILTED LG (18/BG 4

## 2022-08-29 PROCEDURE — 10330087 HSPC SERVICE INTENSITY ADD-ON

## 2022-08-29 PROCEDURE — T2042 HOSPICE ROUTINE HOME CARE: HCPCS

## 2022-09-09 ENCOUNTER — HOME CARE VISIT (OUTPATIENT)
Dept: HOME HOSPICE | Facility: HOSPICE | Age: 87
End: 2022-09-09
Payer: MEDICARE

## 2022-09-09 NOTE — CASE COMMUNICATION
Primary: Jesús Molina was an 27-year-old woman who resided in her [place] apartment  Dtr [place]ia [place] lives in [place]  TOD: Phone call from pts caregiver Ramo Walker   states she thinks pt just passed away  Ramo Walker is tearful   but declined support services  DTR: William Cartwright S[facility] (Request address)  Friend: Alice Campuzano [facility]    Request contact for friend Nahomy Fishman to follow Juan Lott and Ramo Walker on a LR POC in 4-6  weeks